# Patient Record
Sex: MALE | Race: WHITE | NOT HISPANIC OR LATINO | Employment: FULL TIME | URBAN - METROPOLITAN AREA
[De-identification: names, ages, dates, MRNs, and addresses within clinical notes are randomized per-mention and may not be internally consistent; named-entity substitution may affect disease eponyms.]

---

## 2017-01-26 ENCOUNTER — ALLSCRIPTS OFFICE VISIT (OUTPATIENT)
Dept: OTHER | Facility: OTHER | Age: 62
End: 2017-01-26

## 2017-01-26 DIAGNOSIS — Z13.6 ENCOUNTER FOR SCREENING FOR CARDIOVASCULAR DISORDERS: ICD-10-CM

## 2018-01-11 NOTE — PROGRESS NOTES
Assessment    1  Encounter for preventive health examination (V70 0) (Z00 00)    Plan  Health Maintenance    · Follow-up visit in 1 year Evaluation and Treatment  Physical  Status: Hold For -  Scheduling  Requested for: [id number]   · Begin a limited exercise program ; Status:Complete;   Done: 29ZZR3189   · Brush your teeth freq1 and floss at least once a day ; Status:Complete;   Done:  62KNK4608   · Drink plenty of fluids ; Status:Complete;   Done: 60GPI8198   · Limit your use of alcohol to 2 drinks or cans of beer a day ; Status:Complete;   Done:  48NOX0475   · Some eating tips that can help you lose weight ; Status:Complete;   Done: 55NGG1337   · We recommend that you follow these steps to lower your risk of osteoporosis  ;  Status:Complete;   Done: 09XUU3167  Screening for cardiovascular condition    · (1) COMPREHENSIVE METABOLIC PANEL; Status:Active; Requested [id number];    · (1) LIPID PANEL FASTING W DIRECT LDL REFLEX; Status:Active; Requested  [id number];    · (1) URINALYSIS (will reflex a microscopy if leukocytes, occult blood, protein or nitrites  are not within normal limits); Status:Active; Requested [id number];   Screening PSA (prostate specific antigen)    · (1) PSA (SCREEN) (Dx V76 44 Screen for Prostate Cancer); Status:Active; Requested  [id number];     Discussion/Summary  Impression: health maintenance visit, healthy adult male  Currently, he eats a healthy diet and has an adequate exercise regimen  Prostate cancer screening: the risks and benefits of prostate cancer screening were discussed and PSA was ordered  Testicular cancer screening: testicular cancer screening is not indicated  Colorectal cancer screening: the risks and benefits of colorectal cancer screening were discussed, colorectal cancer screening is current and the next colonoscopy is due 2019   The risks and benefits of immunizations were discussed, immunizations are needed and pt to obtain shingles vaccine at local pharmacy  He was advised to be evaluated by a dentist  Advice and education were given regarding nutrition, aerobic exercise, calcium supplements, vitamin D supplements, cardiovascular risk reduction, alcohol use and sunscreen use  Patient discussion: discussed with the patient, 45 minute visit, greater than half of the time was spent on counseling  Depression screen- negative  History of Present Illness  HM, Adult Male: The patient is being seen for a health maintenance evaluation  The last health maintenance visit was more than 1 year(s) ago  General Health: The patient's health since the last visit is described as good  He does not have regular dental visits (he has not established with a local dentist)  The patient brushes time(s) a day, flosses time(s) a day and reports his last dental visit was 11/2015  He complains of vision problems (FOllowed by opto in [place])  Vision care includes wearing reading glasses and an eye examination within the last year  He denies hearing loss  Immunizations status: not up to date (influenza 9/2016; tdap 2013) The patient needs the following immunization(s): zoster vaccine  Lifestyle:  He consumes a diverse and healthy diet  He is on a low carbohydrate and low salt diet  He exercises regularly  He exercises 5 times per week  Exercise includes running/jogging and biking  He does not use tobacco  He consumes alcohol  Reproductive health:  the patient is sexually active  He denies erectile dysfunction  Screening: Prostate cancer screening includes last prostate-specific antigen testing more than 1  Testicular cancer screening includes no self testicular examinations  Colorectal cancer screening includes last colonoscopy done 2014 and Last colonoscopy done 2014, next due 2019  Safety elements used: seat belt, bicycle helmet and smoke detector, but no sunscreen  HPI: 65yo male here as a new patient   His family relocated here from [place] one year ago due to his work  He is originally from [place]  Review of Systems    Constitutional: No fever or chills, feels well, no tiredness, no recent weight gain or weight loss  ENT: no complaints of earache, no hearing loss, no nosebleeds, no nasal discharge, no sore throat, no hoarseness  Cardiovascular: No complaints of slow heart rate, no fast heart rate, no chest pain, no palpitations, no leg claudication, no lower extremity  Respiratory: No complaints of shortness of breath, no wheezing, no cough, no SOB on exertion, no orthopnea or PND  Gastrointestinal: No complaints of abdominal pain, no constipation, no nausea or vomiting, no diarrhea or bloody stools  The patient presents with complaints of nocturia (once nightly)  Musculoskeletal: carmps  Neurological: No compliants of headache, no confusion, no convulsions, no numbness or tingling, no dizziness or fainting, no limb weakness, no difficulty walking  Psychiatric: Is not suicidal, no sleep disturbances, no anxiety or depression, no change in personality, no emotional problems  Past Medical History    · History of Borderline high cholesterol (272 9) (E78 9)   · History of hypertension (V12 59) (Z86 79)   · History of squamous cell carcinoma of skin (V10 83) (U49 023)    Surgical History    · History of Inguinal Hernia Repair    Family History  Mother    · Family history of Colon cancer  Father    · Family history of cerebrovascular accident (CVA) (V17 1) (Z82 3)   · Family history of hypertension (V17 49) (Z82 49)  Brother    · Family history of hypertension (V17 49) (Z82 49)    Social History    · Alcohol use (V49 89) (Z78 9)   ·    · Never a smoker   · Occupation   · Pharmacist    Current Meds   1  Aspirin 81 MG Oral Tablet Delayed Release; TAKE 1 TABLET BY MOUTH EVERY DAY   Recorded    Allergies    1   No Known Drug Allergies    Vitals   Recorded: [id number] 04:06PM Recorded: 48JNP6745 04:02PM   Heart Rate 50 Respiration 16    Systolic 514    Diastolic 90    Height  5 ft 10 in   Weight  171 lb 6 08 oz   BMI Calculated  24 59   BSA Calculated  1 96   O2 Saturation 94      Physical Exam    Constitutional   General appearance: No acute distress, well appearing and well nourished  Head and Face   Head and face: Normal     Eyes   Conjunctiva and lids: No erythema, swelling or discharge  Ears, Nose, Mouth, and Throat   External inspection of ears and nose: Normal     Otoscopic examination: Tympanic membranes translucent with normal light reflex  Canals patent without erythema  Hearing: Normal     Nasal mucosa, septum, and turbinates: Normal without edema or erythema  Lips, teeth, and gums: Normal, good dentition  Oropharynx: Normal with no erythema, edema, exudate or lesions  Neck   Neck: Supple, symmetric, trachea midline, no masses  Thyroid: Normal, no thyromegaly  Pulmonary   Respiratory effort: No increased work of breathing or signs of respiratory distress  Auscultation of lungs: Clear to auscultation  Cardiovascular   Auscultation of heart: Normal rate and rhythm, normal S1 and S2, no murmurs  Carotid pulses: 2+ bilaterally  Pedal pulses: 2+ bilaterally  Examination of extremities for edema and/or varicosities: Normal     Abdomen   Abdomen: Non-tender, no masses  The abdomen was flat  Bowel sounds were normal  The abdomen was soft and nontender  The abdomen was normal to percussion  Stool sample for occult blood: Negative  Genitourinary   Digital rectal exam of prostate: Normal size, no masses  Lymphatic   Palpation of lymph nodes in neck: No lymphadenopathy  Musculoskeletal   Gait and station: Normal     Neurologic No focal deficit     Psychiatric   Orientation to person, place and time: Normal     Mood and affect: Normal        Results/Data  PHQ-2 Adult Depression Screening 26Jan2017 04:27PM [place]     Test Name Result Flag Reference   PHQ-2 Adult Depression Score 0     Over the last two weeks, how often have you been bothered by any of the following problems?   Little interest or pleasure in doing things: Not at all - 0  Feeling down, depressed, or hopeless: Not at all - 0   PHQ-2 Adult Depression Screening Negative         Signatures   Electronically signed by : Jessica Calle DO; Jan 26 2017  4:45PM EST                       (Author)

## 2018-01-14 VITALS
RESPIRATION RATE: 16 BRPM | DIASTOLIC BLOOD PRESSURE: 90 MMHG | SYSTOLIC BLOOD PRESSURE: 130 MMHG | WEIGHT: 171.38 LBS | HEART RATE: 50 BPM | OXYGEN SATURATION: 94 % | BODY MASS INDEX: 24.54 KG/M2 | HEIGHT: 70 IN

## 2018-02-01 ENCOUNTER — OFFICE VISIT (OUTPATIENT)
Dept: INTERNAL MEDICINE CLINIC | Facility: CLINIC | Age: 63
End: 2018-02-01
Payer: COMMERCIAL

## 2018-02-01 VITALS
TEMPERATURE: 97.5 F | BODY MASS INDEX: 25.11 KG/M2 | RESPIRATION RATE: 16 BRPM | SYSTOLIC BLOOD PRESSURE: 110 MMHG | HEART RATE: 52 BPM | HEIGHT: 70 IN | OXYGEN SATURATION: 99 % | WEIGHT: 175.4 LBS | DIASTOLIC BLOOD PRESSURE: 80 MMHG

## 2018-02-01 DIAGNOSIS — Z00.00 ROUTINE HEALTH MAINTENANCE: Primary | ICD-10-CM

## 2018-02-01 DIAGNOSIS — Z11.59 NEED FOR HEPATITIS C SCREENING TEST: ICD-10-CM

## 2018-02-01 DIAGNOSIS — Z12.5 SCREENING FOR PROSTATE CANCER: ICD-10-CM

## 2018-02-01 DIAGNOSIS — Z13.6 SCREENING FOR CARDIOVASCULAR CONDITION: ICD-10-CM

## 2018-02-01 PROCEDURE — 99396 PREV VISIT EST AGE 40-64: CPT | Performed by: INTERNAL MEDICINE

## 2018-02-01 NOTE — PATIENT INSTRUCTIONS

## 2018-02-01 NOTE — PROGRESS NOTES
Assessment/Plan:    No problem-specific Assessment & Plan notes found for this encounter  1  Routine health maintenance     2  Screening for cardiovascular condition  Comprehensive metabolic panel    Lipid panel    UA (URINE) with reflex to Microscopic   3  Need for hepatitis C screening test  Hepatitis C antibody   4  Screening for prostate cancer  PSA         Subjective:      Patient ID: Jorge A Mazariegos is a 63 y o  male  Jorge A Mazariegos is here for yearly physical   Over the past two days he has felt "off"  Denies fever  Last dental visit 1/2018  Last eye visit 12/2016      Immunization History  Administered            Date(s) Administered    Influenza TIV (IM)    09/01/2016      MMR                   10/31/2012      Tdap                  01/01/2013      Tuberculin Skin Test-PPD Intradermal                          10/29/2012      Lifestyle:    Diet is regular, sometimes low carb, low sodium              Physical activity exercises five times per week, running and cycling and minimal free weights    reports that he drinks about 6 0 oz of alcohol per week   reports that he has never smoked  He has never used smokeless tobacco     reports that he does not use drugs  Reproductive:     has no sexual activity history on file  He is sexually active with his wife  Erectile dysfunction No    Cancer Screenings:   Testicular n/a   Last PSA Yes 2/4/17   Colonoscopy 2014, next due in 2019             The following portions of the patient's history were reviewed and updated as appropriate: allergies, current medications, past family history, past medical history, past social history, past surgical history and problem list     Review of Systems   Constitutional: Negative  HENT: Negative  Respiratory: Negative  Cardiovascular: Negative  Gastrointestinal: Negative  Genitourinary:        Occasional nocturia but admits to drinking tea before bed   Musculoskeletal: Negative  Neurological: Negative  Psychiatric/Behavioral: Negative  Objective:  /80 (BP Location: Left arm, Patient Position: Sitting, Cuff Size: Adult)   Pulse (!) 52   Temp 97 5 °F (36 4 °C)   Resp 16   Ht 5' 10" (1 778 m)   Wt 79 6 kg (175 lb 6 4 oz)   SpO2 99%   BMI 25 17 kg/m²        Physical Exam   Constitutional: He is oriented to person, place, and time  He appears well-developed and well-nourished  No distress  HENT:   Right Ear: External ear normal    Left Ear: External ear normal    Nose: Nose normal    Mouth/Throat: Oropharynx is clear and moist  No oropharyngeal exudate  Eyes: EOM are normal  Pupils are equal, round, and reactive to light  Neck: Neck supple  Cardiovascular: Normal rate, regular rhythm, normal heart sounds and intact distal pulses  Pulmonary/Chest: Effort normal    Abdominal: Soft  Bowel sounds are normal  There is no tenderness  Genitourinary: Rectum normal and prostate normal  Rectal exam shows guaiac negative stool  Lymphadenopathy:     He has no cervical adenopathy  Neurological: He is alert and oriented to person, place, and time  Psychiatric: He has a normal mood and affect  Vitals reviewed

## 2018-03-08 ENCOUNTER — OFFICE VISIT (OUTPATIENT)
Dept: INTERNAL MEDICINE CLINIC | Facility: CLINIC | Age: 63
End: 2018-03-08
Payer: COMMERCIAL

## 2018-03-08 VITALS
SYSTOLIC BLOOD PRESSURE: 138 MMHG | OXYGEN SATURATION: 98 % | HEART RATE: 55 BPM | DIASTOLIC BLOOD PRESSURE: 84 MMHG | HEIGHT: 70 IN | TEMPERATURE: 96.1 F | WEIGHT: 176 LBS | BODY MASS INDEX: 25.2 KG/M2

## 2018-03-08 DIAGNOSIS — R42 DIZZINESS: Primary | ICD-10-CM

## 2018-03-08 DIAGNOSIS — R00.1 SINUS BRADYCARDIA: ICD-10-CM

## 2018-03-08 DIAGNOSIS — H93.11 TINNITUS OF RIGHT EAR: ICD-10-CM

## 2018-03-08 PROCEDURE — 99214 OFFICE O/P EST MOD 30 MIN: CPT | Performed by: INTERNAL MEDICINE

## 2018-03-08 PROCEDURE — 93000 ELECTROCARDIOGRAM COMPLETE: CPT | Performed by: INTERNAL MEDICINE

## 2018-03-08 NOTE — ASSESSMENT & PLAN NOTE
Clinical history consistent with vertigo  With tinnitus and FH of mernier's there is possibility of Meniere's as well    Obtain labs and audiogram

## 2018-03-08 NOTE — PROGRESS NOTES
Assessment/Plan:    Dizziness  Clinical history consistent with vertigo  With tinnitus and FH of mernier's there is possibility of Meniere's as well  Obtain labs and audiogram       1  Dizziness  POCT ECG    TSH, 3rd generation with T4 reflex    CBC    Basic metabolic panel    Comprehensive hearing evaluation   2  Tinnitus of right ear  Comprehensive hearing evaluation   3  Sinus bradycardia      noted on ekg  Patient is physically active  will monitor  Subjective:      Patient ID: Albert Veras is a 63 y o  male  HPI     He has had two episodes of vertigo  First episode several weeks ago, felt disoriented, no nausea and fell asleep  Last Wed had another episode, felt unstable walking  If he moves his head sideways then would have rocking sensation, lasting for a few seconds, he went home and slept it off  He has tinnitus and has FH of meniere's disease  He denies hearing loss  He has had a HA in the past few weeks but he does not usually have Ha  He denies URI or allergy symptoms, CP or palpitations  The following portions of the patient's history were reviewed and updated as appropriate: allergies, current medications, past family history, past medical history, past social history, past surgical history and problem list     Current Outpatient Prescriptions:     aspirin 81 MG tablet, Take 1 tablet by mouth daily, Disp: , Rfl:       Review of Systems   Constitutional: Negative for fever  HENT: Positive for tinnitus  Negative for hearing loss  Eyes: Negative  Respiratory: Negative  Cardiovascular: Negative  Gastrointestinal: Negative for nausea  Neurological: Positive for dizziness and headaches  Negative for syncope        Objective:      /84 (BP Location: Left arm, Patient Position: Sitting, Cuff Size: Adult)   Pulse 55   Temp (!) 96 1 °F (35 6 °C) (Tympanic)   Ht 5' 10" (1 778 m)   Wt 79 8 kg (176 lb)   SpO2 98%   BMI 25 25 kg/m²        Physical Exam Constitutional: He is oriented to person, place, and time  He appears well-developed and well-nourished  No distress  HENT:   Right Ear: External ear normal    Left Ear: External ear normal    Nose: Nose normal    Mouth/Throat: Oropharynx is clear and moist  No oropharyngeal exudate  Eyes: Conjunctivae and EOM are normal  Pupils are equal, round, and reactive to light  No nystagmus   Cardiovascular: Regular rhythm and normal heart sounds  Bradycardia present  Pulmonary/Chest: Breath sounds normal  No respiratory distress  Neurological: He is alert and oriented to person, place, and time  Piqua hallpike maneuver neg   Vitals reviewed  EKG: sinus jessica at 53bpm, normal axis, rsr' in V1  No old ekg to compare

## 2018-03-22 ENCOUNTER — OFFICE VISIT (OUTPATIENT)
Dept: INTERNAL MEDICINE CLINIC | Facility: CLINIC | Age: 63
End: 2018-03-22
Payer: COMMERCIAL

## 2018-03-22 VITALS
SYSTOLIC BLOOD PRESSURE: 130 MMHG | RESPIRATION RATE: 14 BRPM | HEIGHT: 70 IN | DIASTOLIC BLOOD PRESSURE: 80 MMHG | TEMPERATURE: 97.3 F | OXYGEN SATURATION: 97 % | BODY MASS INDEX: 24.85 KG/M2 | WEIGHT: 173.6 LBS | HEART RATE: 65 BPM

## 2018-03-22 DIAGNOSIS — E78.2 MIXED HYPERLIPIDEMIA: ICD-10-CM

## 2018-03-22 DIAGNOSIS — R42 VERTIGO: Primary | ICD-10-CM

## 2018-03-22 PROCEDURE — 3008F BODY MASS INDEX DOCD: CPT | Performed by: INTERNAL MEDICINE

## 2018-03-22 PROCEDURE — 99214 OFFICE O/P EST MOD 30 MIN: CPT | Performed by: INTERNAL MEDICINE

## 2018-03-22 NOTE — ASSESSMENT & PLAN NOTE
Increasing episodes associated with tinnitus and vision change  Has HLD and FH of stroke  Developed new [place]  Obtain MRI brain r/o posterior stroke, if negative consider vestibular rehab

## 2018-03-22 NOTE — PROGRESS NOTES
Assessment/Plan:    Vertigo  Increasing episodes associated with tinnitus and vision change  Has HLD and FH of stroke  Developed new [facility]  Obtain MRI brain r/o posterior stroke, if negative consider vestibular rehab  1  Vertigo  MRI brain w wo contrast   2  Mixed hyperlipidemia  MRI brain w wo contrast       Subjective:      Patient ID: Yumiko Ramirez is a 63 y o  male  HPI     He was seen and evaluated for vertigo symptoms two weeks ago, with two episodes occurring several weeks prior to presentation  At the time, he felt disoriented associated with tunnel vision, had rocking sensation with his head sideways and slept off his symptoms  No nausea noted  He reported having FH of meniere's which today he reports is incorrect  His father had strokes  Since, he has had three other episodes  He shook his head with a bit of force, another episode when he was reading on his computer and felt unstable  He took meclizine and fell asleep  He has had a HA in the past month, described as a "blinding Ha", improved with excedrin, he thought he was dehydrated, but does not normally get HAs   No chest pain, SOB, fever  Has tinnitus for which he is going for audiogram next week  The following portions of the patient's history were reviewed and updated as appropriate: allergies, current medications, past family history, past medical history, past social history, past surgical history and problem list     Current Outpatient Prescriptions:     aspirin 81 MG tablet, Take 1 tablet by mouth daily, Disp: , Rfl:       Review of Systems   Constitutional: Negative for fever  HENT: Positive for sneezing and tinnitus  Negative for hearing loss  Eyes: Positive for visual disturbance  Respiratory: Negative  Cardiovascular: Negative  Gastrointestinal: Negative for nausea  Neurological: Positive for dizziness and headaches  Negative for syncope         Objective:    /80   Pulse 65   Temp (!) 97 3 °F (36 3 °C)   Resp 14   Ht 5' 10" (1 778 m)   Wt 78 7 kg (173 lb 9 6 oz)   SpO2 97%   BMI 24 91 kg/m²      Physical Exam   Constitutional: He is oriented to person, place, and time  He appears well-developed and well-nourished  No distress  HENT:   Right Ear: External ear normal    Left Ear: External ear normal    Nose: Nose normal    Mouth/Throat: Oropharynx is clear and moist  No oropharyngeal exudate  Eyes: Conjunctivae and EOM are normal  Pupils are equal, round, and reactive to light  No nystagmus   Cardiovascular: Normal rate, regular rhythm and normal heart sounds  Pulmonary/Chest: Breath sounds normal  No respiratory distress  Neurological: He is alert and oriented to person, place, and time  No cranial nerve deficit  Constantine hallpike maneuver neg   Psychiatric: He has a normal mood and affect  Vitals reviewed

## 2018-03-24 ENCOUNTER — HOSPITAL ENCOUNTER (OUTPATIENT)
Dept: MRI IMAGING | Facility: HOSPITAL | Age: 63
Discharge: HOME | End: 2018-03-24
Payer: COMMERCIAL

## 2018-03-24 DIAGNOSIS — R42 VERTIGO: ICD-10-CM

## 2018-03-24 DIAGNOSIS — E78.2 MIXED HYPERLIPIDEMIA: ICD-10-CM

## 2018-03-24 PROCEDURE — A9585 GADOBUTROL INJECTION: HCPCS | Performed by: INTERNAL MEDICINE

## 2018-03-24 PROCEDURE — 70553 MRI BRAIN STEM W/O & W/DYE: CPT

## 2018-03-24 RX ADMIN — GADOBUTROL 8 ML: 604.72 INJECTION INTRAVENOUS at 16:24

## 2018-03-26 ENCOUNTER — OFFICE VISIT (OUTPATIENT)
Dept: AUDIOLOGY | Age: 63
End: 2018-03-26
Payer: COMMERCIAL

## 2018-03-26 DIAGNOSIS — H90.3 SENSORINEURAL HEARING LOSS, BILATERAL: Primary | ICD-10-CM

## 2018-03-26 PROCEDURE — 92557 COMPREHENSIVE HEARING TEST: CPT | Performed by: AUDIOLOGIST

## 2018-03-26 PROCEDURE — 92567 TYMPANOMETRY: CPT | Performed by: AUDIOLOGIST

## 2018-03-26 NOTE — LETTER
March 26, 2018     Humphrey En,   [address]  [address], [address] [facility],[address]    Patient: Minh Hines   YOB: 1955   Date of Visit: 3/26/2018       Dear Dr Luis Alberto Rodriguez: Thank you for referring Minh Hines to me for evaluation  Below are my notes for this consultation  If you have questions, please do not hesitate to call me  I look forward to following your patient along with you           Sincerely,        Uzma Ha        CC: No Recipients

## 2018-06-11 ENCOUNTER — TRANSCRIBE ORDERS (OUTPATIENT)
Dept: PHYSICAL THERAPY | Facility: CLINIC | Age: 63
End: 2018-06-11

## 2018-06-11 ENCOUNTER — EVALUATION (OUTPATIENT)
Dept: PHYSICAL THERAPY | Facility: CLINIC | Age: 63
End: 2018-06-11
Payer: COMMERCIAL

## 2018-06-11 DIAGNOSIS — H81.8X1 UNILATERAL VESTIBULAR WEAKNESS, RIGHT: Primary | ICD-10-CM

## 2018-06-11 DIAGNOSIS — R42 VERTIGO: ICD-10-CM

## 2018-06-11 PROCEDURE — 97110 THERAPEUTIC EXERCISES: CPT

## 2018-06-11 PROCEDURE — 97162 PT EVAL MOD COMPLEX 30 MIN: CPT

## 2018-06-11 PROCEDURE — G8979 MOBILITY GOAL STATUS: HCPCS

## 2018-06-11 PROCEDURE — G8978 MOBILITY CURRENT STATUS: HCPCS

## 2018-06-11 NOTE — PROGRESS NOTES
PT Evaluation     Today's date: 2018  Patient name: Karina Damon  :   MRN: [id number]  Referring provider: Robin Mondragon MD  Dx:   Encounter Diagnosis     ICD-10-CM    1  Unilateral vestibular weakness, right H81 8X1    2  Vertigo R42        Start Time:   Stop Time: 8521  Total time in clinic (min): 55 minutes    Assessment  Impairments: activity intolerance and impaired balance  Patient presents with symptom irritability yes  Assessment details: Pt is a 66year old male referred from ENT with diagnosis of unilateral vestibular weakness evaluated by physical therapy on 18  Pt presents with impaired balance in tandem stance with EC, and has mild to moderate symptom reproduction with VOR 1 for horizontal and vertical directions  Occulomotor testing, positional testing and head thrust were negative for nystagmus or symptom reproduction  Motion Sensitivity Quotient (MSQ) score was 0 19%, however indicated mild symptom reproduction for head turns and head tilt  Pt presented with Dizziness Handicap Inventory (DHI) score of 48/100 indicating moderate impact on pt quality of life  Pt is not a fall risk based upon DGI score but is a good candidate for physical therapy to address the above listed deficits and progress toward goals of reducing symptom reproduction and returning to previous level of activity  Pt's availability may be limited by work scheduling, therefore increased emphasis on HEP should be utilized  Understanding of Dx/Px/POC: excellent  Goals  ST-4 weeks  1 ) Early independence with HEP for VOR 1 with VS and static tandem balance  2 ) Decrease symptom reproduction by 50% with head tilt/turning    3 ) Increased tandem balance with EC > 20 seconds  4 ) Decreased DHI score < 20/100  LT-8 weeks   1 ) Decreased DHI score < 10/100   2 ) Return to full speed ADL's   3 ) Return to full speed recreational activity of biking with no symptom reproduction     4 ) Increased tandem standing balance with EC > 30 sec  Plan  Patient would benefit from: skilled physical therapy  Planned therapy interventions: balance, manual therapy, neuromuscular re-education, therapeutic activities, therapeutic exercise, canalith repositioning, patient education and home exercise program  Frequency: 2x week  Duration in weeks: 8  Plan details: POC 18 to 18  Subjective Evaluation    History of Present Illness  Mechanism of injury: 66year old male reports earlier in the year, he noted rocking/woosiness/imbalance while at work and had difficulty driving home  This repeated a few weeks later  MRI revealed mucosal thickening in sinuses  Hearing test reveals hearing loss in the right ear  VNG was done one month ago, with finding of unilateral vestibular weakness right  Current symptoms: tinnitus intermittently, and every two weeks dizzy sx present, at a level of 3/5  He sits and rest at that point  It's a few hours for sx to resolve  Balance: improving but not back to normal   Work: adminstrative  He has returned to work  Not avoiding any activity  He has returned to mountain biking  Pain  Current pain ratin  Location: denies pain  Social Support  Steps to enter house: yes  Stairs in house: yes   Lives in: multiple-level home  Lives with: spouse    Employment status: working  Treatments  No previous or current treatments  Patient Goals  Patient goals for therapy: improved balance and return to sport/leisure activities  Patient goal: eliminate dizziness  Objective     Ambulation     Ambulation: Level Surfaces     Additional Level Surfaces Ambulation Details  Coordination:   Finger to nose: normal  Heel to shin:  normal  WILMER:  UE:normal     LE: normal  Strength:  Hip flexion:  R=5L=5  Quads:  R = 5   L = 5  HS=5/5  Ankles: R=5  L=5    Oculomotor:  SP: normal,  Saccades:normal  Head thrust: normal  REO:normal, 30 sec   REC:normal  SREO:normal  FT EO foam= 30 sec, normal  FT EC foam = 30 seconds, mild sway   SREC:(+)  SLS:L=  30 sec   r=30 sec     Gait speed:1 79m/sec or  5 8 ft/sec   DGI:24/24  TUG:NT  5XSTS:NT  DHI=48/100    MSQ=   Positionals  Right Roll test negative  Left Roll test negative  Right DixHallpike: negative   Left DixHallpike: negative  Gaze stabilization:   x1 60 seconds H= mild dizziness provoked  x1 60 seconds V=mild dizziness provoked  Pt has been performing this exercise at home without sx    x2 horizontal 60 sec= no sx  Flowsheet Rows      Most Recent Value   PT/OT G-Codes   Current Score  79   Projected Score  94   FOTO information reviewed  Yes   Assessment Type  Evaluation   G code set  Mobility: Walking & Moving Around   Mobility: Walking and Moving Around Current Status ()  CJ   Mobility: Walking and Moving Around Goal Status ()  CI          Precautions standard    Specialty Daily Treatment Diary     Manual                                                     Exercise Diary  6/11/18       VOR 1 with VS Contrast added to HEP         Tandem stance EO/EC 20 sec, 5 reps        Tandem stance EO/EO with HT 20 reps each, H & V         VOR 2 with VS        Foam with HT EO/EC        Gait with HT , diagnonals         Gait with VOR 1                                                                                                                     Modalities

## 2018-06-11 NOTE — LETTER
2018    Goldy Amador, [address] [employer] [facility] [address]    Patient: Derick Pickens   YOB: 1955   Date of Visit: 2018     Encounter Diagnosis     ICD-10-CM    1  Unilateral vestibular weakness, right H81 8X1    2  Vertigo R42        Dear Dr Darliss Fleischer:    Please review the attached Plan of Care from [facility] recent visit  Please verify that you agree therapy should continue by signing the attached document and sending it back to our office  If you have any questions or concerns, please don't hesitate to call  Sincerely,    Shauna Meza, PT      Referring Provider:      I certify that I have read the below Plan of Care and certify the need for these services furnished under this plan of treatment while under my care  Goldy Amador MD  [address] [facility]  [address]  VIA Facsimile: [phone]          PT Evaluation     Today's date: 2018  Patient name: Derick Pickens  :   MRN: [id number]  Referring provider: Geri Guillermo MD  Dx:   Encounter Diagnosis     ICD-10-CM    1  Unilateral vestibular weakness, right H81 8X1    2  Vertigo R42        Start Time:   Stop Time:   Total time in clinic (min): 55 minutes    Assessment  Impairments: activity intolerance and impaired balance  Patient presents with symptom irritability yes  Assessment details: Pt is a 66year old male referred from ENT with diagnosis of unilateral vestibular weakness evaluated by physical therapy on 18  Pt presents with impaired balance in tandem stance with EC, and has mild to moderate symptom reproduction with VOR 1 for horizontal and vertical directions  Occulomotor testing, positional testing and head thrust were negative for nystagmus or symptom reproduction  Motion Sensitivity Quotient (MSQ) score was 0 19%, however indicated mild symptom reproduction for head turns and head tilt   Pt presented with Dizziness Handicap Inventory (DHI) score of 48/100 indicating moderate impact on pt quality of life  Pt is not a fall risk based upon DGI score but is a good candidate for physical therapy to address the above listed deficits and progress toward goals of reducing symptom reproduction and returning to previous level of activity  Pt's availability may be limited by work scheduling, therefore increased emphasis on HEP should be utilized  Understanding of Dx/Px/POC: excellent  Goals  ST-4 weeks  1 ) Early independence with HEP for VOR 1 with VS and static tandem balance  2 ) Decrease symptom reproduction by 50% with head tilt/turning    3 ) Increased tandem balance with EC > 20 seconds  4 ) Decreased DHI score < 20/100  LT-8 weeks   1 ) Decreased DHI score < 10/100   2 ) Return to full speed ADL's   3 ) Return to full speed recreational activity of biking with no symptom reproduction  4 ) Increased tandem standing balance with EC > 30 sec  Plan  Patient would benefit from: skilled physical therapy  Planned therapy interventions: balance, manual therapy, neuromuscular re-education, therapeutic activities, therapeutic exercise, canalith repositioning, patient education and home exercise program  Frequency: 2x week  Duration in weeks: 8  Plan details: POC 18 to 18  Subjective Evaluation    History of Present Illness  Mechanism of injury: 66year old male reports earlier in the year, he noted rocking/woosiness/imbalance while at work and had difficulty driving home  This repeated a few weeks later  MRI revealed mucosal thickening in sinuses  Hearing test reveals hearing loss in the right ear  VNG was done one month ago, with finding of unilateral vestibular weakness right  Current symptoms: tinnitus intermittently, and every two weeks dizzy sx present, at a level of 3/5  He sits and rest at that point  It's a few hours for sx to resolve   Balance: improving but not back to normal   Work: adminstrative  He has returned to work  Not avoiding any activity  He has returned to mountain biking  Pain  Current pain ratin  Location: denies pain  Social Support  Steps to enter house: yes  Stairs in house: yes   Lives in: multiple-level home  Lives with: spouse    Employment status: working  Treatments  No previous or current treatments  Patient Goals  Patient goals for therapy: improved balance and return to sport/leisure activities  Patient goal: eliminate dizziness  Objective     Ambulation     Ambulation: Level Surfaces     Additional Level Surfaces Ambulation Details  Coordination:   Finger to nose: normal  Heel to shin:  normal  WILMER:  UE:normal     LE: normal  Strength:  Hip flexion:  R=5L=5  Quads:  R = 5   L = 5  HS=5/5  Ankles: R=5  L=5  Oculomotor:  SP: normal,  Saccades:normal  Head thrust: normal  REO:normal, 30 sec   REC:normal  SREO:normal  FT EO foam= 30 sec, normal  FT EC foam = 30 seconds, mild sway   SREC:(+)  SLS:L=  30 sec   r=30 sec     Gait speed:1 79m/sec or  5 8 ft/sec   DGI:  TUG:NT  5XSTS:NT  DHI=48/100    MSQ=   Positionals  Right Roll test negative  Left Roll test negative  Right DixHallpike: negative   Left DixHallpike: negative  Gaze stabilization:   x1 60 seconds H= mild dizziness provoked  x1 60 seconds V=mild dizziness provoked  Pt has been performing this exercise at home without sx    x2 horizontal 60 sec= no sx           Flowsheet Rows      Most Recent Value   PT/OT G-Codes   Current Score  79   Projected Score  94   FOTO information reviewed  Yes   Assessment Type  Evaluation   G code set  Mobility: Walking & Moving Around   Mobility: Walking and Moving Around Current Status ()  CJ   Mobility: Walking and Moving Around Goal Status ()  CI          Precautions standard    Specialty Daily Treatment Diary     Manual                                                     Exercise Diary  18       VOR 1 with VS Contrast added to HEP         Tandem stance EO/EC 20 sec, 5 reps        Tandem stance EO/EO with HT 20 reps each, H & V         VOR 2 with VS        Foam with HT EO/EC        Gait with HT , diagnonals         Gait with VOR 1                                                                                                                     Modalities

## 2018-06-18 ENCOUNTER — OFFICE VISIT (OUTPATIENT)
Dept: PHYSICAL THERAPY | Facility: CLINIC | Age: 63
End: 2018-06-18
Payer: COMMERCIAL

## 2018-06-18 DIAGNOSIS — R42 VERTIGO: ICD-10-CM

## 2018-06-18 DIAGNOSIS — H81.8X1 UNILATERAL VESTIBULAR WEAKNESS, RIGHT: Primary | ICD-10-CM

## 2018-06-18 PROCEDURE — 97110 THERAPEUTIC EXERCISES: CPT

## 2018-06-18 PROCEDURE — G8980 MOBILITY D/C STATUS: HCPCS

## 2018-06-18 PROCEDURE — G8979 MOBILITY GOAL STATUS: HCPCS

## 2018-06-18 PROCEDURE — 97112 NEUROMUSCULAR REEDUCATION: CPT

## 2018-06-18 PROCEDURE — 97530 THERAPEUTIC ACTIVITIES: CPT

## 2018-06-25 ENCOUNTER — APPOINTMENT (OUTPATIENT)
Dept: PHYSICAL THERAPY | Facility: CLINIC | Age: 63
End: 2018-06-25
Payer: COMMERCIAL

## 2019-03-05 ENCOUNTER — OFFICE VISIT (OUTPATIENT)
Dept: INTERNAL MEDICINE CLINIC | Facility: CLINIC | Age: 64
End: 2019-03-05
Payer: COMMERCIAL

## 2019-03-05 VITALS
SYSTOLIC BLOOD PRESSURE: 130 MMHG | BODY MASS INDEX: 24.31 KG/M2 | TEMPERATURE: 98.6 F | OXYGEN SATURATION: 99 % | HEART RATE: 56 BPM | DIASTOLIC BLOOD PRESSURE: 72 MMHG | RESPIRATION RATE: 16 BRPM | WEIGHT: 169.8 LBS | HEIGHT: 70 IN

## 2019-03-05 DIAGNOSIS — Z12.5 SCREENING FOR PROSTATE CANCER: ICD-10-CM

## 2019-03-05 DIAGNOSIS — Z00.00 ANNUAL PHYSICAL EXAM: Primary | ICD-10-CM

## 2019-03-05 DIAGNOSIS — Z12.11 SCREENING FOR COLON CANCER: ICD-10-CM

## 2019-03-05 DIAGNOSIS — E78.2 MIXED HYPERLIPIDEMIA: ICD-10-CM

## 2019-03-05 PROBLEM — M65.9 TENOSYNOVITIS OF HAND: Status: ACTIVE | Noted: 2019-03-05

## 2019-03-05 PROBLEM — M65.949 TENOSYNOVITIS OF HAND: Status: ACTIVE | Noted: 2019-03-05

## 2019-03-05 PROBLEM — H93.11 TINNITUS OF RIGHT EAR: Status: RESOLVED | Noted: 2018-03-08 | Resolved: 2019-03-05

## 2019-03-05 PROCEDURE — 99396 PREV VISIT EST AGE 40-64: CPT | Performed by: INTERNAL MEDICINE

## 2019-03-05 NOTE — PROGRESS NOTES
ADULT ANNUAL PHYSICAL  [facility] INTERNAL MEDICINE    NAME: Alis Herzog  AGE: 66 y o  SEX: male  : 1955     DATE: 3/5/2019     Assessment and Plan:     Problem List Items Addressed This Visit        Other    Mixed hyperlipidemia    Relevant Orders    Lipid panel    Comprehensive metabolic panel    TSH, 3rd generation with Free T4 reflex      Other Visit Diagnoses     Annual physical exam    -  Primary    Screening for colon cancer        Relevant Orders    Ambulatory referral to Gastroenterology    Screening for prostate cancer        Relevant Orders    PSA, Total Screen          Health maintenance and preventative care screenings were discussed with patient today  Appropriate education was printed on patient's after visit summary  · Discussed risks/benefits of screening for colorectal cancer, prostate cancer and high cholesterol  Patient agrees to screening for colorectal cancer, prostate cancer and high cholesterol  · Immunizations were reviewed: patient is up-to-date with his immunizations  Shingrix when available    Counseling:  · Dental Health: discussed importance of regular tooth brushing, flossing, and dental visits  Return in about 1 year (around 3/5/2020) for Annual physical      Chief Complaint:     Chief Complaint   Patient presents with    Physical Exam      History of Present Illness:     Adult Annual Physical   Patient here for a comprehensive physical exam  The patient reports no problems  Reports lump in his L hand over the past 1 month  He is R handed  Diet and Physical Activity  · Diet/Nutrition: well balanced diet  · Weight concerns: none, patient's BMI is between 18 5-24 9  · Exercise: moderate cardiovascular exercise        Depression Screening  PHQ-9 Depression Screening    PHQ-9:    Frequency of the following problems over the past two weeks:       Little interest or pleasure in doing things:  0 - not at all  Feeling down, depressed, or hopeless:  0 - not at all  PHQ-2 Score:  0       General Health  · Sleep: gets 7-8 hours of sleep on average  · Hearing: significantly decreased - right  · Vision: most recent eye exam <1 year ago and wears reading glasses  · Dental: regular dental visits   Health  · Erectile dysfunction: no      Review of Systems:     Review of Systems   Constitutional:        +gradual weight loss   HENT: Negative  Respiratory: Negative  Cardiovascular: Negative  Gastrointestinal: Negative  Genitourinary: Negative  Musculoskeletal:        Hand problem   Neurological: Negative  Psychiatric/Behavioral: Negative  Past Medical History:     Past Medical History:   Diagnosis Date    Squamous cell skin cancer     left forearm      Past Surgical History:     Past Surgical History:   Procedure Laterality Date    HERNIA REPAIR Bilateral     inguinal      Social History:     Social History     Socioeconomic History    Marital status: /Civil Union     Spouse name: None    Number of children: None    Years of education: None    Highest education level: None   Occupational History    Occupation: pharmacist   Social Needs    Financial resource strain: None    Food insecurity:     Worry: None     Inability: None    Transportation needs:     Medical: None     Non-medical: None   Tobacco Use    Smoking status: Never Smoker    Smokeless tobacco: Never Used   Substance and Sexual Activity    Alcohol use:  Yes     Alcohol/week: 6 0 oz     Types: 10 Cans of beer per week    Drug use: No    Sexual activity: None   Lifestyle    Physical activity:     Days per week: None     Minutes per session: None    Stress: None   Relationships    Social connections:     Talks on phone: None     Gets together: None     Attends Restorationism service: None     Active member of club or organization: None     Attends meetings of clubs or organizations: None     Relationship status: None    Intimate partner violence:     Fear of current or ex partner: None     Emotionally abused: None     Physically abused: None     Forced sexual activity: None   Other Topics Concern    None   Social History Narrative          Family History:     Family History   Problem Relation Age of Onset    Cancer Mother         colon, diagnosed in her 42's    Stroke Father         CVA    Hypertension Father     Hypertension Brother       Current Medications:     No current outpatient medications on file  No current facility-administered medications for this visit  Allergies:     No Known Allergies   Objective:     /72 (BP Location: Left arm, Patient Position: Sitting)   Pulse 56   Temp 98 6 °F (37 °C)   Resp 16   Ht 5' 10" (1 778 m)   Wt 77 kg (169 lb 12 8 oz)   SpO2 99%   BMI 24 36 kg/m²     Physical Exam   Constitutional: He is oriented to person, place, and time  He appears well-developed and well-nourished  HENT:   Right Ear: External ear normal    Left Ear: External ear normal    Nose: Nose normal    Mouth/Throat: Oropharynx is clear and moist    Eyes: Conjunctivae and EOM are normal    Neck: Neck supple  Cardiovascular: Normal rate, regular rhythm, normal heart sounds and intact distal pulses  Pulmonary/Chest: Effort normal and breath sounds normal    Abdominal: Soft  Bowel sounds are normal    Musculoskeletal:    strength 5/5 b/l  L third digit tendon hypertrophy with nodularity on palmar aspect   Neurological: He is alert and oriented to person, place, and time  Skin: Skin is dry  Psychiatric: He has a normal mood and affect  His behavior is normal    Vitals reviewed         Health Maintenance:     Health Maintenance   Topic Date Due    Hepatitis C Screening  1955    CRC Screening: Colonoscopy  1955    Pneumococcal PPSV23 Highest Risk Adult (1 of 3 - PCV13) 05/18/1976    BMI: Adult  03/22/2019    Depression Screening PHQ  06/11/2019    DTaP,Tdap,and Td Vaccines (2 - Td) 01/01/2023    INFLUENZA VACCINE  Completed    HEPATITIS B VACCINES  Aged [employer] History   Administered Date(s) Administered    INFLUENZA 09/12/2018    Influenza TIV (IM) 09/01/2016, 09/01/2017    MMR 10/31/2012    Tdap 01/01/2013    Tuberculin Skin Test-PPD Intradermal 10/29/2012       Pattie Jefferson DO  [facility] INTERNAL MEDICINE

## 2019-03-05 NOTE — PATIENT INSTRUCTIONS
Wellness Visit for Adults   AMBULATORY CARE:   A wellness visit  is when you see your healthcare provider to get screened for health problems  You can also get advice on how to stay healthy  Write down your questions so you remember to ask them  Ask your healthcare provider how often you should have a wellness visit  What happens at a wellness visit:  Your healthcare provider will ask about your health, and your family history of health problems  This includes high blood pressure, heart disease, and cancer  He or she will ask if you have symptoms that concern you, if you smoke, and about your mood  You may also be asked about your intake of medicines, supplements, food, and alcohol  Any of the following may be done:  · Your weight  will be checked  Your height may also be checked so your body mass index (BMI) can be calculated  Your BMI shows if you are at a healthy weight  · Your blood pressure  and heart rate will be checked  Your temperature may also be checked  · Blood and urine tests  may be done  Blood tests may be done to check your cholesterol levels  Abnormal cholesterol levels increase your risk for heart disease and stroke  You may also need a blood or urine test to check for diabetes if you are at increased risk  Urine tests may be done to look for signs of an infection or kidney disease  · A physical exam  includes checking your heartbeat and lungs with a stethoscope  Your healthcare provider may also check your skin to look for sun damage  · Screening tests  may be recommended  A screening test is done to check for diseases that may not cause symptoms  The screening tests you may need depend on your age, gender, family history, and lifestyle habits  For example, colorectal screening may be recommended if you are 48years old or older  Screening tests you need if you are a woman:   · A Pap smear  is used to screen for cervical cancer   Pap smears are usually done every 3 to 5 years depending on your age  You may need them more often if you have had abnormal Pap smear test results in the past  Ask your healthcare provider how often you should have a Pap smear  · A mammogram  is an x-ray of your breasts to screen for breast cancer  Experts recommend mammograms every 2 years starting at age 48 years  You may need a mammogram at age 52 years or younger if you have an increased risk for breast cancer  Talk to your healthcare provider about when you should start having mammograms and how often you need them  Vaccines you may need:   · Get an influenza vaccine  every year  The influenza vaccine protects you from the flu  Several types of viruses cause the flu  The viruses change over time, so new vaccines are made each year  · Get a tetanus-diphtheria (Td) booster vaccine  every 10 years  This vaccine protects you against tetanus and diphtheria  Tetanus is a severe infection that may cause painful muscle spasms and lockjaw  Diphtheria is a severe bacterial infection that causes a thick covering in the back of your mouth and throat  · Get a human papillomavirus (HPV) vaccine  if you are female and aged 23 to 32 or male 23 to 24 and never received it  This vaccine protects you from HPV infection  HPV is the most common infection spread by sexual contact  HPV may also cause vaginal, penile, and anal cancers  · Get a pneumococcal vaccine  if you are aged 72 years or older  The pneumococcal vaccine is an injection given to protect you from pneumococcal disease  Pneumococcal disease is an infection caused by pneumococcal bacteria  The infection may cause pneumonia, meningitis, or an ear infection  · Get a shingles vaccine  if you are aged 61 or older, even if you have had shingles before  The shingles vaccine is an injection to protect you from the varicella-zoster virus  This is the same virus that causes chickenpox   Shingles is a painful rash that develops in people who had chickenpox or have been exposed to the virus  How to eat healthy:  My Plate is a model for planning healthy meals  It shows the types and amounts of foods that should go on your plate  Fruits and vegetables make up about half of your plate, and grains and protein make up the other half  A serving of dairy is included on the side of your plate  The amount of calories and serving sizes you need depends on your age, gender, weight, and height  Examples of healthy foods are listed below:  · Eat a variety of vegetables  such as dark green, red, and orange vegetables  You can also include canned vegetables low in sodium (salt) and frozen vegetables without added butter or sauces  · Eat a variety of fresh fruits , canned fruit in 100% juice, frozen fruit, and dried fruit  · Include whole grains  At least half of the grains you eat should be whole grains  Examples include whole-wheat bread, wheat pasta, brown rice, and whole-grain cereals such as oatmeal     · Eat a variety of protein foods such as seafood (fish and shellfish), lean meat, and poultry without skin (turkey and chicken)  Examples of lean meats include pork leg, shoulder, or tenderloin, and beef round, sirloin, tenderloin, and extra lean ground beef  Other protein foods include eggs and egg substitutes, beans, peas, soy products, nuts, and seeds  · Choose low-fat dairy products such as skim or 1% milk or low-fat yogurt, cheese, and cottage cheese  · Limit unhealthy fats  such as butter, hard margarine, and shortening  Exercise:  Exercise at least 30 minutes per day on most days of the week  Some examples of exercise include walking, biking, dancing, and swimming  You can also fit in more physical activity by taking the stairs instead of the elevator or parking farther away from stores  Include muscle strengthening activities 2 days each week  Regular exercise provides many health benefits   It helps you manage your weight, and decreases your risk for type 2 diabetes, heart disease, stroke, and high blood pressure  Exercise can also help improve your mood  Ask your healthcare provider about the best exercise plan for you  General health and safety guidelines:   · Do not smoke  Nicotine and other chemicals in cigarettes and cigars can cause lung damage  Ask your healthcare provider for information if you currently smoke and need help to quit  E-cigarettes or smokeless tobacco still contain nicotine  Talk to your healthcare provider before you use these products  · Limit alcohol  A drink of alcohol is 12 ounces of beer, 5 ounces of wine, or 1½ ounces of liquor  · Lose weight, if needed  Being overweight increases your risk of certain health conditions  These include heart disease, high blood pressure, type 2 diabetes, and certain types of cancer  · Protect your skin  Do not sunbathe or use tanning beds  Use sunscreen with a SPF 15 or higher  Apply sunscreen at least 15 minutes before you go outside  Reapply sunscreen every 2 hours  Wear protective clothing, hats, and sunglasses when you are outside  · Drive safely  Always wear your seatbelt  Make sure everyone in your car wears a seatbelt  A seatbelt can save your life if you are in an accident  Do not use your cell phone when you are driving  This could distract you and cause an accident  Pull over if you need to make a call or send a text message  · Practice safe sex  Use latex condoms if are sexually active and have more than one partner  Your healthcare provider may recommend screening tests for sexually transmitted infections (STIs)  · Wear helmets, lifejackets, and protective gear  Always wear a helmet when you ride a bike or motorcycle, go skiing, or play sports that could cause a head injury  Wear protective equipment when you play sports  Wear a lifejacket when you are on a boat or doing water sports    © 2017 [employer] Information is for End User's use only and may not be sold, redistributed or otherwise used for commercial purposes  All illustrations and images included in CareNotes® are the copyrighted property of [employer]  or Eduar Perez  The above information is an  only  It is not intended as medical advice for individual conditions or treatments  Talk to your doctor, nurse or pharmacist before following any medical regimen to see if it is safe and effective for you  Cholesterol and Your Health   AMBULATORY CARE:   Cholesterol  is a waxy, fat-like substance  Cholesterol is made by your body, but also comes from certain foods you eat  Your body uses cholesterol to make hormones and new cells  Your body also uses cholesterol to protect nerves  Cholesterol comes from foods such as meat and dairy products  Your total cholesterol level is made up by LDL cholesterol, HDL cholesterol, and triglycerides:  · LDL cholesterol  is called bad cholesterol  because it forms plaque in your arteries  As plaque builds up, your arteries become narrow, and less blood flows through  When plaque decreases blood flow to your heart, you may have chest pain  If plaque completely blocks an artery that bring blood to your heart, you may have a heart attack  Plaque can break off and form blood clots  Blood clots may block arteries in your brain and cause a stroke  · HDL cholesterol  is called good cholesterol  because it helps remove LDL cholesterol from your arteries  It does this by attaching to LDL cholesterol and carrying it to your liver  Your liver breaks down LDL cholesterol so your body can get rid of it  High levels of HDL cholesterol can help prevent a heart attack and stroke  Low levels of HDL cholesterol can increase your risk for heart disease, heart attack, and stroke  · Triglycerides  are a type of fat that store energy from foods you eat  High levels of triglycerides also cause plaque buildup   This can increase your risk for a heart attack or stroke  If your triglyceride level is high, your LDL cholesterol level may also be high  How food affects your cholesterol levels:   · Unhealthy fats  increase LDL cholesterol and triglyceride levels in your blood  They are found in foods high in cholesterol, saturated fat, and trans fat:     ¨ Cholesterol  is found in eggs, dairy, and meat  ¨ Saturated fat  is found in butter, cheese, ice cream, whole milk, and coconut oil  Saturated fat is also found in meat, such as sausage, hot dogs, and bologna  ¨ Trans fat  is found in liquid oils and is used in fried and baked foods  Foods that contain trans fats include chips, crackers, muffins, sweet rolls, microwave popcorn, and cookies  · Healthy fats,  also called unsaturated fats, help lower LDL cholesterol and triglyceride levels  Healthy fats include the following:     ¨ Monounsaturated fats  are found in foods such as olive oil, canola oil, avocado, nuts, and olives  ¨ Polyunsaturated fats,  such as omega 3 fats, are found in fish, such as salmon, trout, and tuna  They can also be found in plant foods such as flaxseed, walnuts, and soybeans  Other things that affect your cholesterol levels:   · Smoking cigarettes    · Being overweight or obese     · Drinking large amounts of alcohol    · Not enough exercise or no exercise    · Certain genes passed from your parents to you  What you need to know about having your cholesterol levels checked: Adults 21to 39years of age should have their cholesterol levels checked every 4 to 6 years  Adults 45 years and older should have their cholesterol checked every 1 to 2 years  You may need your cholesterol checked more often, or at a younger age, if you have risk factors for heart disease  You may also need to have your cholesterol checked more often if you have other health conditions, such as diabetes  Blood tests are used to check cholesterol levels   Blood tests measure your levels of triglycerides, LDL cholesterol, and HDL cholesterol  Cholesterol level goals: Your cholesterol level goal may depend on your risk for heart disease  It may also depend on your age and other health conditions  Ask your healthcare provider if the following goals are right for you:  · Your total cholesterol level  should be less than 200 mg/dL  This number may also depend on your HDL and LDL cholesterol goals  · Your LDL cholesterol level  should be less than 130 mg/dL  · Your HDL cholesterol level  should be 60 mg/dL or higher  · Your triglyceride level  should be less than 150 mg/dL  Treatment for high cholesterol:  Treatment for high cholesterol will also decrease your risk of heart disease, heart attack, and stroke  Treatment may include any of the following:  · Medicines  may be given to lower your LDL cholesterol, triglyceride levels, or total cholesterol level  You may need medicines to lower your cholesterol if any of the following is true:     ¨ You have a history of stroke, TIA, unstable angina, or a heart attack    ¨ Your LDL cholesterol level is 190 mg/dL or higher    ¨ You are age 36to 76years of age, have diabetes, and your LDL cholesterol is 70 mg/dL or higher    ¨ You are age 36to 76years of age, have risk factors for heart disease, and your LDL cholesterol is 70 mg/dL or higher    · Lifestyle changes  include changes to your diet, exercise, weight loss, and quitting smoking  It also includes decreasing the amount of alcohol you drink  · Supplements  include fish oil, red yeast rice, and garlic  Fish oil may help lower your triglyceride and LDL cholesterol levels  It may also increase your HDL cholesterol level  Red yeast rice may help decrease your total cholesterol level and LDL cholesterol level  Garlic may help lower your total cholesterol level  Do not take these supplements without talking to your healthcare provider     Nutrition to help lower your cholesterol levels:  A registered dietitian can help you create a healthy eating plan  Read food labels and choose foods low in saturated fat, trans fats, and cholesterol  · Decrease the total amount of fat you eat  Choose lean meats, fat-free or 1% fat milk, and low-fat dairy products, such as yogurt and cheese  Try to limit or avoid red meats  Limit or do not eat fried foods or baked goods such as cookies  · Replace unhealthy fats with healthy fats  Cook foods in olive oil or canola oil  Choose soft margarines that are low in saturated fat and trans fat  Seeds, nuts, and avocados are other examples of healthy fats  · Eat foods with omega-3 fats  Examples include salmon, tuna, mackerel, walnuts, and flaxseed  Eat fish 2 times per week  Children and pregnant women should not eat fish that have high levels of mercury, such as shark, swordfish, and perico mackerel  · Increase the amount of plant-based foods you eat  Plant-based foods are low in cholesterol and fat  Eating more of these foods may help lower your cholesterol and help you lose weight  Examples of plant-based foods includes fruits, vegetables, legumes, and whole grains  Replace milk that contains dairy with almond, soy, or coconut milk  Eat beans and foods with soy for protein instead of meat  Ask your healthcare provider or dietitian for more information on plant-based foods  · Increase the amount of fiber you eat  High-fiber foods can help lower your LDL cholesterol  You should eat between 20 and 30 grams of fiber each day  Eat at least 5 servings of fruits and vegetables each day  Other examples of high-fiber foods include whole-grain or whole-wheat breads, pastas, or cereals, and brown rice  Eat 3 ounces of whole-grain foods each day  Increase fiber slowly  You may have abdominal discomfort, bloating, and gas if you add fiber to your diet too quickly  Lifestyle changes you can make to help lower your cholesterol levels:   · Maintain a healthy weight  Ask your healthcare provider how much you should weigh  Ask him or her to help you create a weight loss plan if you are overweight  Weight loss can decrease your total cholesterol and triglyceride levels  · Exercise regularly  Exercise can help lower your total cholesterol level and maintain a healthy weight  Exercise can also help increase your HDL cholesterol level  Work with your healthcare provider to create an exercise program that is right for you  Get at least 30 minutes of moderate exercise most days of the week  Examples of exercise include brisk walking, swimming, or biking  · Do not smoke  Nicotine and other chemicals in cigarettes and cigars can damage your lungs, heart, and blood vessels  They can also raise your triglyceride levels  Ask your healthcare provider for information if you currently smoke and need help to quit  E-cigarettes or smokeless tobacco still contain nicotine  Talk to your healthcare provider before you use these products  · Limit or do not drink alcohol  Alcohol can increase your triglyceride levels  Ask your healthcare provider if it is safe for you to drink alcohol  Also ask how much is safe for you to drink each day  © 2017 [address] [facility] Information is for End User's use only and may not be sold, redistributed or otherwise used for commercial purposes  All illustrations and images included in CareNotes® are the copyrighted property of [employer]  or Eduar Perez  The above information is an  only  It is not intended as medical advice for individual conditions or treatments  Talk to your doctor, nurse or pharmacist before following any medical regimen to see if it is safe and effective for you

## 2019-05-14 NOTE — PROGRESS NOTES
PT DISCHARGE NOTE       18: Pt emailed PT on 18, responding to PT phone call regarding status  Pt stated he no longer has imbalance or dizziness issues and he will not return to PT  If dizziness returns he plans to return to PT  Several goals known to be met  DC from PT at this time  Goals  ST-4 weeks  1 ) Early independence with HEP for VOR 1 with VS and static tandem balance  MET  2 ) Decrease symptom reproduction by 50% with head tilt/turning  MET  3 ) Increased tandem balance with EC > 20 seconds  4 ) Decreased DHI score < 20/100  LT-8 weeks   1 ) Decreased DHI score < 10/100   2 ) Return to full speed ADL's   3 ) Return to full speed recreational activity of biking with no symptom reproduction  MET  4 ) Increased tandem standing balance with EC > 30 sec  Today's date: 2018 Daily Note  Patient name: Sergey Clifford  :   MRN: [id number]  Referring provider: Maye Garcia MD  Dx:   Encounter Diagnosis     ICD-10-CM    1  Unilateral vestibular weakness, right H81 8X1    2  Vertigo R42        Start Time: 1246  Stop Time: 4110  Total time in clinic (min): 69 minutes    Subjective: Pt reports having an episode on Friday, not drastic, but lasting a while  Sleeping overnight did not take care of it, which is the usual course  Symptoms were "sea-sick," and significant tinnitus  He denied fluctuations in hearing in this time frame, but struggles to hear when the tinnitus is loud  He exercised vigorously the day before, running in a 5K  Current sx: no dizziness  Objective: See treatment diary below  Precautions standard    Specialty Daily Treatment Diary     Manual                                                     Exercise Diary  18      VOR 1 with VS Contrast added to HEP  FT on FOAM  90 sec  2 reps  No sx       VOR 2   Foam  FT 90 sec  VCS  Sx w/H only  No sx w/V         Tandem stance EO/EC 20 sec, 5 reps        Tandem stance EO/EO with HT 20 reps Scheduled for:  Colonoscopy 69221  Provider Name: Dr. Gigi Graham  Date:  6/24/19  Location:  [facility]  Sedation:  IV  Time:  9:15 am, arrival 8:15 am  Prep: Trilyte  Meds/Allergies Reconciled?:  Elsi reviewed  Diagnosis with codes:  Screening Z12.11, Hx of polyps Z86. each, H & V  Tandem walk  With HT         Foam with HT EO/EC        Gait with HT , diagnonals   60 sec each  UR, DL  UL, DR  No sx  Gait with VOR 1   1 90 sec H  No sx   2  For/rev gait  VCS 90 sec       Gait with VOR 2   1  For/rev 90 sec  VCS mild sx during H    2  V 90 sec,   Very mild sx  [facility]  from foam   15 reps 2 sets  L/R No sx               VOR CX w/gait   90 sec  No sx  VOR x2   Side step beam   90 sec H  No dizziness                                                           Modalities                                      Assessment: Tolerated treatment well  Patient would benefit from continued PT  VOR 2 with for/rev gait and visual contrast background added to HEP, along with tandem gait with head turns,see copies in media  VORx2 as above was the only activity to provoke sx this visit  Plan: Continue per plan of care  Pt will call to report any patterns he finds in provocation of sx  NV progress visuo-vestibularactivity

## 2019-10-02 ENCOUNTER — CONSULT (OUTPATIENT)
Dept: GASTROENTEROLOGY | Facility: AMBULARY SURGERY CENTER | Age: 64
End: 2019-10-02
Payer: COMMERCIAL

## 2019-10-02 VITALS
HEART RATE: 72 BPM | WEIGHT: 173.6 LBS | DIASTOLIC BLOOD PRESSURE: 72 MMHG | RESPIRATION RATE: 16 BRPM | TEMPERATURE: 98.5 F | HEIGHT: 70 IN | BODY MASS INDEX: 24.85 KG/M2 | SYSTOLIC BLOOD PRESSURE: 124 MMHG

## 2019-10-02 DIAGNOSIS — Z12.11 SCREENING FOR COLON CANCER: ICD-10-CM

## 2019-10-02 DIAGNOSIS — Z80.0 FAMILY HISTORY OF COLON CANCER: Primary | ICD-10-CM

## 2019-10-02 PROCEDURE — 99203 OFFICE O/P NEW LOW 30 MIN: CPT | Performed by: INTERNAL MEDICINE

## 2019-10-02 NOTE — ASSESSMENT & PLAN NOTE
Patient is at increased risk for colon cancer screening  Rule out colorectal lesions including polyps or malignancy     -Schedule for colonoscopy  -High-fiber diet     -Patient was given instructions about the colonoscopy prep     -Patient was explained about  the risks and benefits of the procedure  Risks including but not limited to bleeding, infection, perforation were explained in detail  Also explained about less than 100% sensitivity with the exam and other alternatives

## 2019-10-02 NOTE — PROGRESS NOTES
Consultation - [facility]  Demetrio Cote 1955 male         Chief Complaint:  For colonoscopy    HPI:  81-year-old male with no significant past medical history was referred for colonoscopy  His mother had colon cancer and his last colonoscopy was 5 years ago  Patient has regular bowel movements and denies any blood or mucus in the stool  Appetite is good and denies any recent weight loss  Denies any abdominal pain, nausea, or vomiting  Has no heartburn or acid reflux  Denies any difficulty swallowing  REVIEW OF SYSTEMS: Review of Systems   Constitutional: Negative for activity change, appetite change, chills, diaphoresis, fatigue, fever and unexpected weight change  HENT: Negative for ear discharge, ear pain, facial swelling, hearing loss, nosebleeds, sore throat, tinnitus and voice change  Eyes: Negative for pain, discharge, redness, itching and visual disturbance  Respiratory: Negative for apnea, cough, chest tightness, shortness of breath and wheezing  Cardiovascular: Negative for chest pain and palpitations  Gastrointestinal:        As noted in HPI   Endocrine: Negative for cold intolerance, heat intolerance and polyuria  Genitourinary: Negative for difficulty urinating, dysuria, flank pain, hematuria and urgency  Musculoskeletal: Negative for arthralgias, back pain, gait problem, joint swelling and myalgias  Skin: Negative for rash and wound  Neurological: Negative for dizziness, tremors, seizures, speech difficulty, light-headedness, numbness and headaches  Hematological: Negative for adenopathy  Does not bruise/bleed easily  Psychiatric/Behavioral: Negative for agitation, behavioral problems and confusion  The patient is not nervous/anxious           Past Medical History:   Diagnosis Date    Squamous cell skin cancer     left forearm      Past Surgical History:   Procedure Laterality Date    HERNIA REPAIR Bilateral     inguinal     Social History Socioeconomic History    Marital status: /Civil Union     Spouse name: Not on file    Number of children: Not on file    Years of education: Not on file    Highest education level: Not on file   Occupational History    Occupation: pharmacist   Social Needs    Financial resource strain: Not on file    Food insecurity:     Worry: Not on file     Inability: Not on file    Transportation needs:     Medical: Not on file     Non-medical: Not on file   Tobacco Use    Smoking status: Never Smoker    Smokeless tobacco: Never Used   Substance and Sexual Activity    Alcohol use: Yes     Alcohol/week: 10 0 standard drinks     Types: 10 Cans of beer per week     Frequency: 2-3 times a week     Drinks per session: 1 or 2     Binge frequency: Never    Drug use: No    Sexual activity: Not on file   Lifestyle    Physical activity:     Days per week: Not on file     Minutes per session: Not on file    Stress: Not on file   Relationships    Social connections:     Talks on phone: Not on file     Gets together: Not on file     Attends Mu-ism service: Not on file     Active member of club or organization: Not on file     Attends meetings of clubs or organizations: Not on file     Relationship status: Not on file    Intimate partner violence:     Fear of current or ex partner: Not on file     Emotionally abused: Not on file     Physically abused: Not on file     Forced sexual activity: Not on file   Other Topics Concern    Not on file   Social History Narrative         Family History   Problem Relation Age of Onset    Cancer Mother         colon, diagnosed in her 42's    Stroke Father         CVA    Hypertension Father     Hypertension Brother      Patient has no known allergies    Current Outpatient Medications   Medication Sig Dispense Refill    Na Sulfate-K Sulfate-Mg Sulf (SUPREP BOWEL PREP KIT) 17 5-3 13-1 6 GM/177ML SOLN Take 2 Bottles by mouth see administration instructions Please follow the instructions from the office 2 Bottle 0     No current facility-administered medications for this visit  Blood pressure 124/72, pulse 72, temperature 98 5 °F (36 9 °C), temperature source Tympanic, resp  rate 16, height 5' 10" (1 778 m), weight 78 7 kg (173 lb 9 6 oz)  PHYSICAL EXAM: Physical Exam   Constitutional: He is oriented to person, place, and time  He appears well-developed  HENT:   Head: Normocephalic and atraumatic  Mouth/Throat: Oropharynx is clear and moist    Eyes: Pupils are equal, round, and reactive to light  Conjunctivae are normal  Right eye exhibits no discharge  Left eye exhibits no discharge  No scleral icterus  Neck: Neck supple  No JVD present  No tracheal deviation present  No thyromegaly present  Cardiovascular: Normal rate, regular rhythm, normal heart sounds and intact distal pulses  Exam reveals no gallop and no friction rub  No murmur heard  Pulmonary/Chest: Effort normal and breath sounds normal  No respiratory distress  He has no wheezes  He has no rales  He exhibits no tenderness  Abdominal: Soft  Bowel sounds are normal  He exhibits no distension and no mass  There is no tenderness  There is no rebound and no guarding  No hernia  Musculoskeletal: He exhibits no edema  Lymphadenopathy:     He has no cervical adenopathy  Neurological: He is alert and oriented to person, place, and time  Skin: Skin is warm and dry  No rash noted  No erythema  Psychiatric: He has a normal mood and affect  His behavior is normal  Thought content normal         No results found for: WBC, HGB, HCT, MCV, PLT  No results found for: GLUCOSE, CALCIUM, NA, K, CO2, CL, BUN, CREATININE  No results found for: ALT, AST, GGT, ALKPHOS, BILITOT  No results found for: INR, PROTIME    Mri Brain W Wo Contrast    Result Date: 3/26/2018  Impression: Normal MRI of the brain and IACs  Scattered paranasal sinus mucosal thickening   Workstation performed: [id number]       ASSESSMENT & PLAN:    Family history of colon cancer  Patient is at increased risk for colon cancer screening  Rule out colorectal lesions including polyps or malignancy     -Schedule for colonoscopy  -High-fiber diet     -Patient was given instructions about the colonoscopy prep     -Patient was explained about  the risks and benefits of the procedure  Risks including but not limited to bleeding, infection, perforation were explained in detail  Also explained about less than 100% sensitivity with the exam and other alternatives

## 2019-10-29 ENCOUNTER — HOSPITAL ENCOUNTER (OUTPATIENT)
Dept: MRI IMAGING | Facility: HOSPITAL | Age: 64
Discharge: HOME | End: 2019-10-29
Attending: OTOLARYNGOLOGY
Payer: COMMERCIAL

## 2019-10-29 DIAGNOSIS — IMO0001 RIGHT ASYMMETRICAL SNHL: ICD-10-CM

## 2019-10-29 PROCEDURE — 70553 MRI BRAIN STEM W/O & W/DYE: CPT

## 2019-10-29 PROCEDURE — A9585 GADOBUTROL INJECTION: HCPCS | Performed by: OTOLARYNGOLOGY

## 2019-10-29 RX ADMIN — GADOBUTROL 7 ML: 604.72 INJECTION INTRAVENOUS at 07:30

## 2019-11-12 PROBLEM — IMO0001 RIGHT ASYMMETRICAL SNHL: Status: ACTIVE | Noted: 2019-11-12

## 2019-11-21 ENCOUNTER — ANESTHESIA (OUTPATIENT)
Dept: ANESTHESIOLOGY | Facility: HOSPITAL | Age: 64
End: 2019-11-21

## 2019-11-21 ENCOUNTER — ANESTHESIA EVENT (OUTPATIENT)
Dept: ANESTHESIOLOGY | Facility: HOSPITAL | Age: 64
End: 2019-11-21

## 2019-12-04 ENCOUNTER — ANESTHESIA EVENT (OUTPATIENT)
Dept: GASTROENTEROLOGY | Facility: AMBULARY SURGERY CENTER | Age: 64
End: 2019-12-04

## 2019-12-05 ENCOUNTER — HOSPITAL ENCOUNTER (OUTPATIENT)
Dept: GASTROENTEROLOGY | Facility: AMBULARY SURGERY CENTER | Age: 64
Setting detail: OUTPATIENT SURGERY
Discharge: HOME | End: 2019-12-05
Attending: INTERNAL MEDICINE
Payer: COMMERCIAL

## 2019-12-05 ENCOUNTER — ANESTHESIA (OUTPATIENT)
Dept: GASTROENTEROLOGY | Facility: AMBULARY SURGERY CENTER | Age: 64
End: 2019-12-05

## 2019-12-05 VITALS
HEIGHT: 70 IN | DIASTOLIC BLOOD PRESSURE: 87 MMHG | HEART RATE: 65 BPM | RESPIRATION RATE: 18 BRPM | OXYGEN SATURATION: 96 % | WEIGHT: 167 LBS | BODY MASS INDEX: 23.91 KG/M2 | TEMPERATURE: 98.2 F | SYSTOLIC BLOOD PRESSURE: 130 MMHG

## 2019-12-05 DIAGNOSIS — Z80.0 FAMILY HISTORY OF COLON CANCER: ICD-10-CM

## 2019-12-05 PROCEDURE — 45385 COLONOSCOPY W/LESION REMOVAL: CPT | Performed by: INTERNAL MEDICINE

## 2019-12-05 PROCEDURE — 45380 COLONOSCOPY AND BIOPSY: CPT | Performed by: INTERNAL MEDICINE

## 2019-12-05 PROCEDURE — 88305 TISSUE EXAM BY PATHOLOGIST: CPT | Performed by: PATHOLOGY

## 2019-12-05 RX ORDER — SODIUM CHLORIDE, SODIUM LACTATE, POTASSIUM CHLORIDE, CALCIUM CHLORIDE 600; 310; 30; 20 MG/100ML; MG/100ML; MG/100ML; MG/100ML
125 INJECTION, SOLUTION INTRAVENOUS CONTINUOUS
Status: DISCONTINUED | OUTPATIENT
Start: 2019-12-05 | End: 2019-12-09 | Stop reason: HOSPADM

## 2019-12-05 RX ORDER — LIDOCAINE HYDROCHLORIDE 10 MG/ML
INJECTION, SOLUTION INFILTRATION; PERINEURAL AS NEEDED
Status: DISCONTINUED | OUTPATIENT
Start: 2019-12-05 | End: 2019-12-05 | Stop reason: SURG

## 2019-12-05 RX ORDER — LIDOCAINE HYDROCHLORIDE 10 MG/ML
0.5 INJECTION, SOLUTION EPIDURAL; INFILTRATION; INTRACAUDAL; PERINEURAL ONCE AS NEEDED
Status: DISCONTINUED | OUTPATIENT
Start: 2019-12-05 | End: 2019-12-09 | Stop reason: HOSPADM

## 2019-12-05 RX ORDER — PROPOFOL 10 MG/ML
INJECTION, EMULSION INTRAVENOUS AS NEEDED
Status: DISCONTINUED | OUTPATIENT
Start: 2019-12-05 | End: 2019-12-05 | Stop reason: SURG

## 2019-12-05 RX ADMIN — PROPOFOL 10 MG: 10 INJECTION, EMULSION INTRAVENOUS at 09:24

## 2019-12-05 RX ADMIN — PROPOFOL 100 MG: 10 INJECTION, EMULSION INTRAVENOUS at 09:15

## 2019-12-05 RX ADMIN — PROPOFOL 20 MG: 10 INJECTION, EMULSION INTRAVENOUS at 09:16

## 2019-12-05 RX ADMIN — PROPOFOL 20 MG: 10 INJECTION, EMULSION INTRAVENOUS at 09:21

## 2019-12-05 RX ADMIN — SODIUM CHLORIDE, SODIUM LACTATE, POTASSIUM CHLORIDE, AND CALCIUM CHLORIDE 125 ML/HR: .6; .31; .03; .02 INJECTION, SOLUTION INTRAVENOUS at 08:50

## 2019-12-05 RX ADMIN — PROPOFOL 30 MG: 10 INJECTION, EMULSION INTRAVENOUS at 09:17

## 2019-12-05 RX ADMIN — LIDOCAINE HYDROCHLORIDE 20 MG: 10 INJECTION, SOLUTION INFILTRATION; PERINEURAL at 09:15

## 2019-12-05 NOTE — ANESTHESIA POSTPROCEDURE EVALUATION
Post-Op Assessment Note    CV Status:  Stable       Mental Status:  Alert and awake   Hydration Status:  Stable   PONV Controlled:  None   Airway Patency:  Patent   Post Op Vitals Reviewed: Yes      Staff: CRNA           /78 (12/05/19 0932)    Temp 98 2 °F (36 8 °C) (12/05/19 0932)    Pulse 61 (12/05/19 0932)   Resp 16 (12/05/19 0932)    SpO2 96 % (12/05/19 0932)    Post procedure VS noted above, SV non obstructed on RA

## 2019-12-05 NOTE — H&P
History and Physical - [facility]  Barb Orion 60 y o  male MRN: [id number]        HPI:  72-year-old male was referred for colonoscopy because of family history of colon cancer in his mother  Regular bowel movements and denies any blood or mucus in the stool  Historical Information   Past Medical History:   Diagnosis Date    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    Squamous cell skin cancer     left forearm     Past Surgical History:   Procedure Laterality Date    COLONOSCOPY      HERNIA REPAIR Bilateral     inguinal     Social History   Social History     Substance and Sexual Activity   Alcohol Use Yes    Alcohol/week: 10 0 standard drinks    Types: 10 Cans of beer per week    Frequency: 2-3 times a week    Drinks per session: 1 or 2    Binge frequency: Never     Social History     Substance and Sexual Activity   Drug Use No     Social History     Tobacco Use   Smoking Status Never Smoker   Smokeless Tobacco Never Used     Family History   Problem Relation Age of Onset    Cancer Mother         colon, diagnosed in her 42's    Stroke Father         CVA    Hypertension Father     Hypertension Brother        Meds/Allergies       (Not in a hospital admission)    No Known Allergies    Objective     Blood pressure (!) 171/99, pulse 69, temperature 97 7 °F (36 5 °C), temperature source Temporal, resp  rate 18, height 5' 10" (1 778 m), weight 75 8 kg (167 lb), SpO2 100 %      PHYSICAL EXAM:    Gen: NAD  CV: S1 & S2 normal, RRR  CHEST: Clear to auscultate  ABD: soft, NT/ND, good bowel sounds  EXT: no edema    ASSESSMENT:     Family history of colon cancer    PLAN:    Colonoscopy

## 2019-12-05 NOTE — ANESTHESIA PREPROCEDURE EVALUATION
Review of Systems/Medical History  Patient summary reviewed  Chart reviewed  No history of anesthetic complications     Cardiovascular  EKG reviewed, Hyperlipidemia,    Pulmonary       GI/Hepatic    Bowel prep            Endo/Other     GYN       Hematology   Musculoskeletal       Neurology      Comment: Sensorineural hearing loss Psychology           Physical Exam    Airway    Mallampati score: I  TM Distance: >3 FB  Neck ROM: full     Dental   No notable dental hx     Cardiovascular      Pulmonary      Other Findings        Anesthesia Plan  ASA Score- 2     Anesthesia Type- IV sedation with anesthesia with ASA Monitors  Additional Monitors:   Airway Plan:         Plan Factors-  Patient did not smoke on day of surgery  Induction-     Postoperative Plan-     Informed Consent- Anesthetic plan and risks discussed with patient  I personally reviewed this patient with the CRNA  Discussed and agreed on the Anesthesia Plan with the CRNA  Yahir Rosales

## 2019-12-18 ENCOUNTER — TELEPHONE (OUTPATIENT)
Dept: GASTROENTEROLOGY | Facility: AMBULARY SURGERY CENTER | Age: 64
End: 2019-12-18

## 2019-12-18 NOTE — TELEPHONE ENCOUNTER
----- Message from Meera Santana MD sent at 12/14/2019 12:54 PM EST -----    Colon polyps removed came back as precancerous tubular adenoma  Next colonoscopy in 3 years  Patient to call our office for any GI symptoms

## 2020-03-07 ENCOUNTER — TELEPHONE (OUTPATIENT)
Dept: OTHER | Facility: OTHER | Age: 65
End: 2020-03-07

## 2020-12-17 DIAGNOSIS — Z12.5 SCREENING FOR PROSTATE CANCER: ICD-10-CM

## 2020-12-17 DIAGNOSIS — E78.2 MIXED HYPERLIPIDEMIA: Primary | ICD-10-CM

## 2020-12-17 DIAGNOSIS — E83.51 HYPOCALCEMIA: ICD-10-CM

## 2021-01-07 ENCOUNTER — OFFICE VISIT (OUTPATIENT)
Dept: INTERNAL MEDICINE CLINIC | Facility: CLINIC | Age: 66
End: 2021-01-07
Payer: COMMERCIAL

## 2021-01-07 VITALS
TEMPERATURE: 97.6 F | DIASTOLIC BLOOD PRESSURE: 80 MMHG | SYSTOLIC BLOOD PRESSURE: 164 MMHG | HEART RATE: 60 BPM | BODY MASS INDEX: 24.62 KG/M2 | WEIGHT: 172 LBS | OXYGEN SATURATION: 96 % | HEIGHT: 70 IN

## 2021-01-07 DIAGNOSIS — R03.0 ELEVATED BP WITHOUT DIAGNOSIS OF HYPERTENSION: ICD-10-CM

## 2021-01-07 DIAGNOSIS — Z00.00 ANNUAL PHYSICAL EXAM: Primary | ICD-10-CM

## 2021-01-07 DIAGNOSIS — R19.09 GROIN LUMP: ICD-10-CM

## 2021-01-07 DIAGNOSIS — Z23 ENCOUNTER FOR IMMUNIZATION: ICD-10-CM

## 2021-01-07 PROCEDURE — 90471 IMMUNIZATION ADMIN: CPT | Performed by: INTERNAL MEDICINE

## 2021-01-07 PROCEDURE — 90750 HZV VACC RECOMBINANT IM: CPT | Performed by: INTERNAL MEDICINE

## 2021-01-07 PROCEDURE — 99396 PREV VISIT EST AGE 40-64: CPT | Performed by: INTERNAL MEDICINE

## 2021-01-07 NOTE — ASSESSMENT & PLAN NOTE
-R inguinal area with remote h/o hernia repair  -advised avoiding activities that would increase intra-abdominal pressure  -would recommend u/s but is returning to [place] in a few days

## 2021-01-07 NOTE — PATIENT INSTRUCTIONS
Wellness Visit for Adults   AMBULATORY CARE:   A wellness visit  is when you see your healthcare provider to get screened for health problems  Your healthcare provider will also give you advice on how to stay healthy  Write down your questions so you remember to ask them  Ask your healthcare provider how often you should have a wellness visit  What happens at a wellness visit:  Your healthcare provider will ask about your health, and your family history of health problems  This includes high blood pressure, heart disease, and cancer  He or she will ask if you have symptoms that concern you, if you smoke, and about your mood  You may also be asked about your intake of medicines, supplements, food, and alcohol  Any of the following may be done:  · Your weight  will be checked  Your height may also be checked so your body mass index (BMI) can be calculated  Your BMI shows if you are at a healthy weight  · Your blood pressure  and heart rate will be checked  Your temperature may also be checked  · Blood and urine tests  may be done  Blood tests may be done to check your cholesterol levels  Abnormal cholesterol levels increase your risk for heart disease and stroke  You may also need a blood or urine test to check for diabetes if you are at increased risk  Urine tests may be done to look for signs of an infection or kidney disease  · A physical exam  includes checking your heartbeat and lungs with a stethoscope  Your healthcare provider may also check your skin to look for sun damage  · Screening tests  may be recommended  A screening test is done to check for diseases that may not cause symptoms  The screening tests you may need depend on your age, gender, family history, and lifestyle habits  For example, colorectal screening may be recommended if you are 48years old or older  Screening tests you need if you are a woman:   · A Pap smear  is used to screen for cervical cancer   Pap smears are usually done every 3 to 5 years depending on your age  You may need them more often if you have had abnormal Pap smear test results in the past  Ask your healthcare provider how often you should have a Pap smear  · A mammogram  is an x-ray of your breasts to screen for breast cancer  Experts recommend mammograms every 2 years starting at age 48 years  You may need a mammogram at age 52 years or younger if you have an increased risk for breast cancer  Talk to your healthcare provider about when you should start having mammograms and how often you need them  Vaccines you may need:   · Get an influenza vaccine  every year  The influenza vaccine protects you from the flu  Several types of viruses cause the flu  The viruses change over time, so new vaccines are made each year  · Get a tetanus-diphtheria (Td) booster vaccine  every 10 years  This vaccine protects you against tetanus and diphtheria  Tetanus is a severe infection that may cause painful muscle spasms and lockjaw  Diphtheria is a severe bacterial infection that causes a thick covering in the back of your mouth and throat  · Get a human papillomavirus (HPV) vaccine  if you are female and aged 23 to 32 or male 23 to 24 and never received it  This vaccine protects you from HPV infection  HPV is the most common infection spread by sexual contact  HPV may also cause vaginal, penile, and anal cancers  · Get a pneumococcal vaccine  if you are aged 72 years or older  The pneumococcal vaccine is an injection given to protect you from pneumococcal disease  Pneumococcal disease is an infection caused by pneumococcal bacteria  The infection may cause pneumonia, meningitis, or an ear infection  · Get a shingles vaccine  if you are 60 or older, even if you have had shingles before  The shingles vaccine is an injection to protect you from the varicella-zoster virus  This is the same virus that causes chickenpox   Shingles is a painful rash that develops in people who had chickenpox or have been exposed to the virus  How to eat healthy:  My Plate is a model for planning healthy meals  It shows the types and amounts of foods that should go on your plate  Fruits and vegetables make up about half of your plate, and grains and protein make up the other half  A serving of dairy is included on the side of your plate  The amount of calories and serving sizes you need depends on your age, gender, weight, and height  Examples of healthy foods are listed below:  · Eat a variety of vegetables  such as dark green, red, and orange vegetables  You can also include canned vegetables low in sodium (salt) and frozen vegetables without added butter or sauces  · Eat a variety of fresh fruits , canned fruit in 100% juice, frozen fruit, and dried fruit  · Include whole grains  At least half of the grains you eat should be whole grains  Examples include whole-wheat bread, wheat pasta, brown rice, and whole-grain cereals such as oatmeal     · Eat a variety of protein foods such as seafood (fish and shellfish), lean meat, and poultry without skin (turkey and chicken)  Examples of lean meats include pork leg, shoulder, or tenderloin, and beef round, sirloin, tenderloin, and extra lean ground beef  Other protein foods include eggs and egg substitutes, beans, peas, soy products, nuts, and seeds  · Choose low-fat dairy products such as skim or 1% milk or low-fat yogurt, cheese, and cottage cheese  · Limit unhealthy fats  such as butter, hard margarine, and shortening  Exercise:  Exercise at least 30 minutes per day on most days of the week  Some examples of exercise include walking, biking, dancing, and swimming  You can also fit in more physical activity by taking the stairs instead of the elevator or parking farther away from stores  Include muscle strengthening activities 2 days each week  Regular exercise provides many health benefits   It helps you manage your weight, and decreases your risk for type 2 diabetes, heart disease, stroke, and high blood pressure  Exercise can also help improve your mood  Ask your healthcare provider about the best exercise plan for you  General health and safety guidelines:   · Do not smoke  Nicotine and other chemicals in cigarettes and cigars can cause lung damage  Ask your healthcare provider for information if you currently smoke and need help to quit  E-cigarettes or smokeless tobacco still contain nicotine  Talk to your healthcare provider before you use these products  · Limit alcohol  A drink of alcohol is 12 ounces of beer, 5 ounces of wine, or 1½ ounces of liquor  · Lose weight, if needed  Being overweight increases your risk of certain health conditions  These include heart disease, high blood pressure, type 2 diabetes, and certain types of cancer  · Protect your skin  Do not sunbathe or use tanning beds  Use sunscreen with a SPF 15 or higher  Apply sunscreen at least 15 minutes before you go outside  Reapply sunscreen every 2 hours  Wear protective clothing, hats, and sunglasses when you are outside  · Drive safely  Always wear your seatbelt  Make sure everyone in your car wears a seatbelt  A seatbelt can save your life if you are in an accident  Do not use your cell phone when you are driving  This could distract you and cause an accident  Pull over if you need to make a call or send a text message  · Practice safe sex  Use latex condoms if are sexually active and have more than one partner  Your healthcare provider may recommend screening tests for sexually transmitted infections (STIs)  · Wear helmets, lifejackets, and protective gear  Always wear a helmet when you ride a bike or motorcycle, go skiing, or play sports that could cause a head injury  Wear protective equipment when you play sports  Wear a lifejacket when you are on a boat or doing water sports      © Copyright [employer] 2020 Information is for End User's use only and may not be sold, redistributed or otherwise used for commercial purposes  All illustrations and images included in CareNotes® are the copyrighted property of [employer]  or Bessy Anderson  The above information is an  only  It is not intended as medical advice for individual conditions or treatments  Talk to your doctor, nurse or pharmacist before following any medical regimen to see if it is safe and effective for you  Cholesterol and Your Health   AMBULATORY CARE:   Cholesterol  is a waxy, fat-like substance  Your body uses cholesterol to make hormones and new cells, and to protect nerves  Cholesterol is made by your body  It also comes from certain foods you eat, such as meat and dairy products  Your healthcare provider can help you set goals for your cholesterol levels  He or she can help you create a plan to meet your goals  Cholesterol level goals: Your cholesterol level goals depend on your risk for heart disease, your age, and your other health conditions  The following are general guidelines:  · Total cholesterol  includes low-density lipoprotein (LDL), high-density lipoprotein (HDL), and triglyceride levels  The total cholesterol level should be lower than 200 mg/dL and is best at about 150 mg/dL  · LDL cholesterol  is called bad cholesterol  because it forms plaque in your arteries  As plaque builds up, your arteries become narrow, and less blood flows through  When plaque decreases blood flow to your heart, you may have chest pain  If plaque completely blocks an artery that brings blood to your heart, you may have a heart attack  Plaque can break off and form blood clots  Blood clots may block arteries in your brain and cause a stroke  The level should be less than 130 mg/dL and is best at about 100 mg/dL  · HDL cholesterol  is called good cholesterol  because it helps remove LDL cholesterol from your arteries   It does this by attaching to LDL cholesterol and carrying it to your liver  Your liver breaks down LDL cholesterol so your body can get rid of it  High levels of HDL cholesterol can help prevent a heart attack and stroke  Low levels of HDL cholesterol can increase your risk for heart disease, heart attack, and stroke  The level should be 60 mg/dL or higher  · Triglycerides  are a type of fat that store energy from foods you eat  High levels of triglycerides also cause plaque buildup  This can increase your risk for a heart attack or stroke  If your triglyceride level is high, your LDL cholesterol level may also be high  The level should be less than 150 mg/dL  What increases your risk for high cholesterol:   · Smoking cigarettes    · Being overweight or obese, or not getting enough exercise    · Drinking large amounts of alcohol    · A medical condition such as hypertension (high blood pressure) or diabetes    · Certain genes passed from your parents to you    · Age older than 65 years    What you need to know about having your cholesterol levels checked: Adults 21to 39years of age should have their cholesterol levels checked every 4 to 6 years  Adults 45 years or older should have their cholesterol checked every 1 to 2 years  You may need your cholesterol checked more often, or at a younger age, if you have risk factors for heart disease  You may also need to have your cholesterol checked more often if you have other health conditions, such as diabetes  Blood tests are used to check cholesterol levels  Blood tests measure your levels of triglycerides, LDL cholesterol, and HDL cholesterol  How healthy fats affect your cholesterol levels:  Healthy fats, also called unsaturated fats, help lower LDL cholesterol and triglyceride levels  Healthy fats include the following:  · Monounsaturated fats  are found in foods such as olive oil, canola oil, avocado, nuts, and olives      · Polyunsaturated fats,  such as omega 3 fats, are found in fish, such as salmon, trout, and tuna  They can also be found in plant foods such as flaxseed, walnuts, and soybeans  How unhealthy fats affect your cholesterol levels:  Unhealthy fats increase LDL cholesterol and triglyceride levels  They are found in foods high in cholesterol, saturated fat, and trans fat:  · Cholesterol  is found in eggs, dairy, and meat  · Saturated fat  is found in butter, cheese, ice cream, whole milk, and coconut oil  Saturated fat is also found in meat, such as sausage, hot dogs, and bologna  · Trans fat  is found in liquid oils and is used in fried and baked foods  Foods that contain trans fats include chips, crackers, muffins, sweet rolls, microwave popcorn, and cookies  Treatment  for high cholesterol will also decrease your risk of heart disease, heart attack, and stroke  Treatment may include any of the following:  · Lifestyle changes  may include food, exercise, weight loss, and quitting smoking  You may also need to decrease the amount of alcohol you drink  Your healthcare provider will want you to start with lifestyle changes  Other treatment may be added if lifestyle changes are not enough  · Medicines  may be given to lower your LDL cholesterol, triglyceride levels, or total cholesterol level  You may need medicines to lower your cholesterol if any of the following is true:     ? You have a history of stroke, TIA, unstable angina, or a heart attack  ? Your LDL cholesterol level is 190 mg/dL or higher  ? You are age 36 to 76 years, have diabetes or heart disease risk factors, and your LDL cholesterol is 70 mg/dL or higher  · Supplements  include fish oil, red yeast rice, and garlic  Fish oil may help lower your triglyceride and LDL cholesterol levels  It may also increase your HDL cholesterol level  Red yeast rice may help decrease your total cholesterol level and LDL cholesterol level  Garlic may help lower your total cholesterol level   Do not take these supplements without talking to your healthcare provider  Food changes you can make to lower your cholesterol levels:  A dietitian can help you create a healthy eating plan  He or she can show you how to read food labels and choose foods low in saturated fat, trans fats, and cholesterol  · Decrease the total amount of fat you eat  Choose lean meats, fat-free or 1% fat milk, and low-fat dairy products, such as yogurt and cheese  Try to limit or avoid red meats  Limit or do not eat fried foods or baked goods, such as cookies  · Replace unhealthy fats with healthy fats  Cook foods in olive oil or canola oil  Choose soft margarines that are low in saturated fat and trans fat  Seeds, nuts, and avocados are other examples of healthy fats  · Eat foods with omega-3 fats  Examples include salmon, tuna, mackerel, walnuts, and flaxseed  Eat fish 2 times per week  Pregnant women should not eat fish that have high levels of mercury, such as shark, swordfish, and perico mackerel  · Increase the amount of high-fiber foods you eat  High-fiber foods can help lower your LDL cholesterol  Aim to get between 20 and 30 grams of fiber each day  Fruits and vegetables are high in fiber  Eat at least 5 servings each day  Other high-fiber foods are whole-grain or whole-wheat breads, pastas, or cereals, and brown rice  Eat 3 ounces of whole-grain foods each day  Increase fiber slowly  You may have abdominal discomfort, bloating, and gas if you add fiber to your diet too quickly  · Eat healthy protein foods  Examples include low-fat dairy products, skinless chicken and turkey, fish, and nuts  · Limit foods and drinks that are high in sugar  Your dietitian or healthcare provider can help you create daily limits for high-sugar foods and drinks  The limit may be lower if you have diabetes or another health condition  Limits can also help you lose weight if needed    Lifestyle changes you can make to lower your cholesterol levels:   · Maintain a healthy weight  Ask your healthcare provider what a healthy weight is for you  Ask him or her to help you create a weight loss plan if needed  Weight loss can decrease your total cholesterol and triglyceride levels  Weight loss may also help keep your blood pressure at a healthy level  · Exercise regularly  Exercise can help lower your total cholesterol level and maintain a healthy weight  Exercise can also help increase your HDL cholesterol level  Work with your healthcare provider to create an exercise program that is right for you  Get at least 30 to 40 minutes of moderate exercise most days of the week  Examples of exercise include brisk walking, swimming, or biking  · Do not smoke  Nicotine and other chemicals in cigarettes and cigars can raise your cholesterol levels  Ask your healthcare provider for information if you currently smoke and need help to quit  E-cigarettes or smokeless tobacco still contain nicotine  Talk to your healthcare provider before you use these products  · Limit or do not drink alcohol  Alcohol can increase your triglyceride levels  Ask your healthcare provider before you drink alcohol  Ask how much is okay for you to drink in 1 day or 1 week  © Copyright [address] Information is for End User's use only and may not be sold, redistributed or otherwise used for commercial purposes  All illustrations and images included in CareNotes® are the copyrighted property of [employer]  or [address] [place], [place] [id number]   The above information is an  only  It is not intended as medical advice for individual conditions or treatments  Talk to your doctor, nurse or pharmacist before following any medical regimen to see if it is safe and effective for you

## 2021-01-07 NOTE — PROGRESS NOTES
ADULT ANNUAL [facility] INTERNAL MEDICINE    NAME: Merle Henson  AGE: 63 y o  SEX: male  : 1955     DATE: 2021     Assessment and Plan:     Problem List Items Addressed This Visit        Other    Elevated BP without diagnosis of hypertension     -adhere to low sodium diet  -continue to monitor home bp with goal <130/80         Groin lump     -R inguinal area with remote h/o hernia repair  -advised avoiding activities that would increase intra-abdominal pressure  -would recommend u/s but is returning to [place] in a few days           Other Visit Diagnoses     Annual physical exam    -  Primary    Encounter for immunization        Relevant Orders    Zoster Vaccine Recombinant IM (Completed)          Immunizations and preventive care screenings were discussed with patient today  Appropriate education was printed on patient's after visit summary  Counseling:  Alcohol/drug use: discussed moderation in alcohol intake, the recommendations for healthy alcohol use, and avoidance of illicit drug use  Dental Health: discussed importance of regular tooth brushing, flossing, and dental visits  · Exercise: the importance of regular exercise/physical activity was discussed  Recommend exercise 3-5 times per week for at least 30 minutes  · Immunizations discussed  shingrix #1 due today  · Cancer screenings discussed  He is up to date  Return in about 1 year (around 2022) for Annual physical      Chief Complaint:     Chief Complaint   Patient presents with    Annual Exam      History of Present Illness:     Adult Annual Physical   Patient here for a comprehensive physical exam   He has been working in [place] most of last year  In the interim, had dx of L knee SCC skin removal      Home BP typical 130/80s  Has had BL inguinal hernia repairs, has not had sensation where he feels he has to press on R side    He has not found identified change with strenuous activity  After sexual interaction he feels R testicle move upwards and has to physically push backdown  Diet and Physical Activity  · Diet/Nutrition: well balanced diet, low carb diet  · Exercise: moderate cardiovascular exercise  Depression Screening  PHQ-9 Depression Screening    PHQ-9:   Frequency of the following problems over the past two weeks:      Little interest or pleasure in doing things: 0 - not at all  Feeling down, depressed, or hopeless: 0 - not at all  PHQ-2 Score: 0       General Health  · Sleep: sleeps well  · Hearing: decreased - right  · Vision: most recent eye exam <1 year ago  · Dental: no dental visits for >1 year   Health  · Symptoms include: nocturia     Review of Systems:     Review of Systems   Constitutional: Negative for activity change, appetite change and fatigue  HENT: Positive for hearing loss and tinnitus  Negative for congestion, mouth sores, rhinorrhea and sinus pain  Respiratory: Negative for cough, chest tightness, shortness of breath and wheezing  Cardiovascular: Negative for chest pain, palpitations and leg swelling  Gastrointestinal: Negative for abdominal pain, constipation, diarrhea, nausea and vomiting  Genitourinary: Negative for decreased urine volume and urgency  Nocturia   Musculoskeletal: Positive for arthralgias  Negative for back pain, gait problem and joint swelling  Neurological: Negative for dizziness and headaches  Psychiatric/Behavioral: Negative for decreased concentration, dysphoric mood and suicidal ideas  The patient is not nervous/anxious         Past Medical History:     Past Medical History:   Diagnosis Date    HL (hearing loss) 2018    Recently more sever    Hypertension 2010 - 2012    resolved    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    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    Squamous cell skin cancer     left forearm      Past Surgical History:     Past Surgical History:   Procedure Laterality Date    COLONOSCOPY      HERNIA REPAIR Bilateral     inguinal      Family History:     Family History   Problem Relation Age of Onset    Cancer Mother         Colon Cancer    Stroke Father         Passed away at 80    Hypertension Father     Hypertension Brother       Social History:        Social History     Socioeconomic History    Marital status: /Civil Union     Spouse name: None    Number of children: None    Years of education: None    Highest education level: None   Occupational History    Occupation: pharmacist   Social Needs    Financial resource strain: None    Food insecurity     Worry: None     Inability: None    Transportation needs     Medical: None     Non-medical: None   Tobacco Use    Smoking status: Never Smoker    Smokeless tobacco: Never Used   Substance and Sexual Activity    Alcohol use: Yes     Alcohol/week: 10 0 standard drinks     Types: 10 Cans of beer per week     Frequency: 2-3 times a week     Drinks per session: 1 or 2     Binge frequency: Never    Drug use: No    Sexual activity: Yes     Partners: Female     Birth control/protection: None     Comment:  single partner   Lifestyle    Physical activity     Days per week: None     Minutes per session: None    Stress: None   Relationships    Social connections     Talks on phone: None     Gets together: None     Attends Gnosticist service: None     Active member of club or organization: None     Attends meetings of clubs or organizations: None     Relationship status: None    Intimate partner violence     Fear of current or ex partner: None     Emotionally abused: None     Physically abused: None     Forced sexual activity: None   Other Topics Concern    None   Social History Narrative          Current Medications:     No current outpatient medications on file  No current facility-administered medications for this visit         Allergies:     No Known Allergies   Physical Exam:     /80 (BP Location: Right arm, Patient Position: Sitting)   Pulse 60   Temp 97 6 °F (36 4 °C) (Tympanic)   Ht 5' 10" (1 778 m)   Wt 78 kg (172 lb)   SpO2 96%   BMI 24 68 kg/m²     Physical Exam  Vitals signs reviewed  Constitutional:       General: He is not in acute distress  Appearance: Normal appearance  He is not ill-appearing  HENT:      Head: Normocephalic  Right Ear: Tympanic membrane, ear canal and external ear normal  There is no impacted cerumen  Left Ear: Tympanic membrane, ear canal and external ear normal  There is no impacted cerumen  Nose: Nose normal  No congestion or rhinorrhea  Mouth/Throat:      Mouth: Mucous membranes are moist       Pharynx: Oropharynx is clear  No oropharyngeal exudate or posterior oropharyngeal erythema  Eyes:      Extraocular Movements: Extraocular movements intact  Conjunctiva/sclera: Conjunctivae normal       Pupils: Pupils are equal, round, and reactive to light  Neck:      Musculoskeletal: Neck supple  No muscular tenderness  Thyroid: No thyromegaly or thyroid tenderness  Vascular: No carotid bruit  Cardiovascular:      Rate and Rhythm: Normal rate and regular rhythm  Pulses: Normal pulses  Heart sounds: Normal heart sounds  Pulmonary:      Effort: Pulmonary effort is normal  No respiratory distress  Breath sounds: Normal breath sounds  No wheezing  Abdominal:      General: Bowel sounds are normal  There is no distension  Palpations: Abdomen is soft  Tenderness: There is no abdominal tenderness  Hernia: A hernia is present  Hernia is present in the right inguinal area  There is no hernia in the left inguinal area  Musculoskeletal:      Right lower leg: No edema  Left lower leg: No edema  Comments: L 4th digit tendon hypertrophy   Lymphadenopathy:      Cervical: No cervical adenopathy  Skin:     General: Skin is warm        Coloration: Skin is not jaundiced or pale  Neurological:      General: No focal deficit present  Mental Status: He is alert and oriented to person, place, and time  Psychiatric:         Attention and Perception: Attention normal          Mood and Affect: Mood and affect normal          Speech: Speech normal          Behavior: Behavior is cooperative            Melania Carlson DO   [address] [facility] INTERNAL MEDICINE

## 2021-07-02 ENCOUNTER — CLINICAL SUPPORT (OUTPATIENT)
Dept: INTERNAL MEDICINE CLINIC | Facility: CLINIC | Age: 66
End: 2021-07-02
Payer: COMMERCIAL

## 2021-07-02 DIAGNOSIS — Z23 ENCOUNTER FOR IMMUNIZATION: Primary | ICD-10-CM

## 2021-07-02 PROCEDURE — 90750 HZV VACC RECOMBINANT IM: CPT

## 2021-07-02 PROCEDURE — 90471 IMMUNIZATION ADMIN: CPT

## 2021-07-07 ENCOUNTER — TELEPHONE (OUTPATIENT)
Dept: INTERNAL MEDICINE CLINIC | Facility: CLINIC | Age: 66
End: 2021-07-07

## 2021-07-07 DIAGNOSIS — Z11.9 ENCOUNTER FOR SCREENING FOR INFECTIOUS AND PARASITIC DISEASES, UNSPECIFIED: Primary | ICD-10-CM

## 2021-07-07 NOTE — TELEPHONE ENCOUNTER
Pts wife wants to know if you can order a coivd test without seeing him because they are traveling and if it is the PCR test?

## 2021-07-12 ENCOUNTER — TELEPHONE (OUTPATIENT)
Dept: INTERNAL MEDICINE CLINIC | Facility: CLINIC | Age: 66
End: 2021-07-12

## 2021-07-12 NOTE — TELEPHONE ENCOUNTER
----- Message from Zach Singh DO sent at 7/12/2021  7:36 AM EDT -----  Please notify of negative COVID PCR    Thanks

## 2021-07-23 ENCOUNTER — TELEPHONE (OUTPATIENT)
Dept: INTERNAL MEDICINE CLINIC | Facility: CLINIC | Age: 66
End: 2021-07-23

## 2021-07-23 DIAGNOSIS — Z11.9 ENCOUNTER FOR SCREENING FOR INFECTIOUS AND PARASITIC DISEASES, UNSPECIFIED: Primary | ICD-10-CM

## 2021-07-23 NOTE — TELEPHONE ENCOUNTER
Patient's wife is calling asking for a PCR COVID test  Patient is here for the week and needs a test to re enter [place]

## 2021-08-13 ENCOUNTER — TELEPHONE (OUTPATIENT)
Dept: INTERNAL MEDICINE CLINIC | Facility: CLINIC | Age: 66
End: 2021-08-13

## 2021-08-13 DIAGNOSIS — Z11.9 ENCOUNTER FOR SCREENING FOR INFECTIOUS AND PARASITIC DISEASES, UNSPECIFIED: Primary | ICD-10-CM

## 2021-08-13 NOTE — TELEPHONE ENCOUNTER
Pt's wife Rebeca Jarvis is calling to request a COVID-19 test for the patient  He is traveling to [facility] this Sunday and it is a requirement for international travel  Pt would appreciate a call back directly at [phone] once generated      Thank you

## 2021-09-09 ENCOUNTER — NURSE TRIAGE (OUTPATIENT)
Dept: OTHER | Facility: OTHER | Age: 66
End: 2021-09-09

## 2021-09-09 ENCOUNTER — TELEPHONE (OUTPATIENT)
Dept: INTERNAL MEDICINE CLINIC | Facility: CLINIC | Age: 66
End: 2021-09-09

## 2021-09-09 DIAGNOSIS — Z11.9 ENCOUNTER FOR SCREENING FOR INFECTIOUS AND PARASITIC DISEASES, UNSPECIFIED: Primary | ICD-10-CM

## 2021-09-09 NOTE — TELEPHONE ENCOUNTER
pts wife called  He is traveling home from [facility] and needs a PCR test   They are aware of the out of pocket expense  Call wife to let her know when the order is placed

## 2021-09-09 NOTE — TELEPHONE ENCOUNTER
This is a patient of [facility]  He was advised to call the office for COVID testing per [facility] protocol

## 2021-09-09 NOTE — TELEPHONE ENCOUNTER
Regarding: ADUQS-18 - Travel 2:2  ----- Message from Madelyn Anton sent at 9/9/2021 10:33 AM EDT -----  "They will be traveling and will need a COVID-19 test "

## 2021-09-11 ENCOUNTER — OFFICE VISIT (OUTPATIENT)
Dept: URGENT CARE | Facility: CLINIC | Age: 66
End: 2021-09-11
Payer: COMMERCIAL

## 2021-09-11 VITALS
SYSTOLIC BLOOD PRESSURE: 162 MMHG | OXYGEN SATURATION: 98 % | RESPIRATION RATE: 14 BRPM | HEART RATE: 71 BPM | TEMPERATURE: 97.2 F | BODY MASS INDEX: 24.97 KG/M2 | DIASTOLIC BLOOD PRESSURE: 98 MMHG | WEIGHT: 174 LBS

## 2021-09-11 DIAGNOSIS — S61.432A PUNCTURE WOUND OF LEFT HAND WITHOUT FOREIGN BODY, INITIAL ENCOUNTER: Primary | ICD-10-CM

## 2021-09-11 PROCEDURE — 99213 OFFICE O/P EST LOW 20 MIN: CPT | Performed by: PHYSICIAN ASSISTANT

## 2021-09-11 RX ORDER — CEPHALEXIN 500 MG/1
500 CAPSULE ORAL EVERY 6 HOURS SCHEDULED
Qty: 28 CAPSULE | Refills: 0 | Status: SHIPPED | OUTPATIENT
Start: 2021-09-11 | End: 2021-09-18

## 2021-09-11 NOTE — PATIENT INSTRUCTIONS
Puncture wound of L hand  No foreign body visualized or palpated  rx keflex 4 times per day x 7 days sent via EMR for prophylaxis  Take with food  Daily wound cleansing and if sees FB return for removal  Tetanus up to date    Follow up with PCP in 3-5 days  Proceed to  ER if symptoms worsen

## 2021-09-11 NOTE — PROGRESS NOTES
[facility]    NAME: Hadassah Nissen is a 61 y o  male  : 1955    MRN: [id number]  DATE: 2021  TIME: 9:02 AM    Assessment and Plan   Puncture wound of left hand without foreign body, initial encounter [S61 [id number]]  1  Puncture wound of left hand without foreign body, initial encounter  cephalexin (KEFLEX) 500 mg capsule     Patient Instructions   Puncture wound of L hand  No foreign body visualized or palpated  rx keflex 4 times per day x 7 days sent via EMR for prophylaxis  Take with food  Daily wound cleansing and if sees FB return for removal  Tetanus up to date    Follow up with PCP in 3-5 days  Proceed to  ER if symptoms worsen  Chief Complaint     Chief Complaint   Patient presents with    Foreign Body in Skin     pt presents with splinter in the palm area of the left hand; states he removed the splinter yesterday, not certain if he removed all of the splinter; swelling noted at the site, tender to touch         History of Present Illness       Iris Phoenix is a 62-year-old male who presents to clinic complaining of a splinter in his left hand  He states he received the injury yesterday and removed a large portion of would but he does not feel like he got all because today he woke up and his hand is significantly more painful  He denies any bleeding, discharge, or decreased range of motion of the hand  He denies any fever or chills  Review of Systems   Review of Systems   Constitutional: Negative for chills and fever  Musculoskeletal: Positive for arthralgias  Negative for joint swelling  Skin: Positive for color change (redness around wound) and wound       Current Medications       Current Outpatient Medications:     cephalexin (KEFLEX) 500 mg capsule, Take 1 capsule (500 mg total) by mouth every 6 (six) hours for 7 days, Disp: 28 capsule, Rfl: 0    Current Allergies     Allergies as of 2021    (No Known Allergies)            The following portions of the patient's history were reviewed and updated as appropriate: allergies, current medications, past family history, past medical history, past social history, past surgical history and problem list      Past Medical History:   Diagnosis Date    HL (hearing loss) 2018    Recently more sever    Hypertension 2010 - 2012    resolved    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    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    Squamous cell skin cancer     left forearm       Past Surgical History:   Procedure Laterality Date    COLONOSCOPY      HERNIA REPAIR Bilateral     inguinal       Family History   Problem Relation Age of Onset    Cancer Mother         Colon Cancer    Stroke Father         Passed away at 80    Hypertension Father     Hypertension Brother          Medications have been verified  Objective   /98   Pulse 71   Temp (!) 97 2 °F (36 2 °C)   Resp 14   Wt 78 9 kg (174 lb)   SpO2 98%   BMI 24 97 kg/m²   No LMP for male patient  Physical Exam     Physical Exam  Vitals and nursing note reviewed  Constitutional:       General: He is not in acute distress  Appearance: Normal appearance  He is not ill-appearing  Cardiovascular:      Rate and Rhythm: Normal rate and regular rhythm  Heart sounds: Normal heart sounds  Pulmonary:      Effort: Pulmonary effort is normal       Breath sounds: Normal breath sounds  Musculoskeletal:      Left hand: Laceration and tenderness present  No swelling, deformity or bony tenderness  Normal range of motion  Normal strength  Normal sensation  There is no disruption of two-point discrimination  Normal capillary refill  Hands:    Neurological:      Mental Status: He is alert and oriented to person, place, and time     Psychiatric:         Mood and Affect: Mood normal          Behavior: Behavior normal

## 2021-09-29 ENCOUNTER — TELEPHONE (OUTPATIENT)
Dept: INTERNAL MEDICINE CLINIC | Facility: CLINIC | Age: 66
End: 2021-09-29

## 2021-09-29 DIAGNOSIS — Z11.9 ENCOUNTER FOR SCREENING FOR INFECTIOUS AND PARASITIC DISEASES, UNSPECIFIED: Primary | ICD-10-CM

## 2021-10-11 ENCOUNTER — OFFICE VISIT (OUTPATIENT)
Dept: INTERNAL MEDICINE CLINIC | Facility: CLINIC | Age: 66
End: 2021-10-11
Payer: COMMERCIAL

## 2021-10-11 VITALS
TEMPERATURE: 97.8 F | HEART RATE: 65 BPM | BODY MASS INDEX: 24.62 KG/M2 | WEIGHT: 172 LBS | HEIGHT: 70 IN | DIASTOLIC BLOOD PRESSURE: 96 MMHG | SYSTOLIC BLOOD PRESSURE: 172 MMHG | OXYGEN SATURATION: 98 %

## 2021-10-11 DIAGNOSIS — Z23 ENCOUNTER FOR IMMUNIZATION: ICD-10-CM

## 2021-10-11 DIAGNOSIS — Z11.59 NEED FOR HEPATITIS C SCREENING TEST: ICD-10-CM

## 2021-10-11 DIAGNOSIS — I10 HYPERTENSION, UNSPECIFIED TYPE: Primary | ICD-10-CM

## 2021-10-11 DIAGNOSIS — Z11.4 SCREENING FOR HIV (HUMAN IMMUNODEFICIENCY VIRUS): ICD-10-CM

## 2021-10-11 DIAGNOSIS — Z13.29 SCREENING FOR THYROID DISORDER: ICD-10-CM

## 2021-10-11 DIAGNOSIS — E78.2 MIXED HYPERLIPIDEMIA: ICD-10-CM

## 2021-10-11 PROCEDURE — 90471 IMMUNIZATION ADMIN: CPT | Performed by: NURSE PRACTITIONER

## 2021-10-11 PROCEDURE — 3008F BODY MASS INDEX DOCD: CPT | Performed by: NURSE PRACTITIONER

## 2021-10-11 PROCEDURE — 99214 OFFICE O/P EST MOD 30 MIN: CPT | Performed by: NURSE PRACTITIONER

## 2021-10-11 PROCEDURE — 90682 RIV4 VACC RECOMBINANT DNA IM: CPT | Performed by: NURSE PRACTITIONER

## 2021-10-11 RX ORDER — AMLODIPINE BESYLATE 5 MG/1
5 TABLET ORAL DAILY
Qty: 30 TABLET | Refills: 0 | Status: SHIPPED | OUTPATIENT
Start: 2021-10-11 | End: 2021-11-02

## 2021-11-02 DIAGNOSIS — I10 HYPERTENSION, UNSPECIFIED TYPE: ICD-10-CM

## 2021-11-02 RX ORDER — AMLODIPINE BESYLATE 5 MG/1
TABLET ORAL
Qty: 30 TABLET | Refills: 0 | Status: SHIPPED | OUTPATIENT
Start: 2021-11-02 | End: 2021-11-12 | Stop reason: SDUPTHER

## 2021-11-12 ENCOUNTER — OFFICE VISIT (OUTPATIENT)
Dept: INTERNAL MEDICINE CLINIC | Facility: CLINIC | Age: 66
End: 2021-11-12
Payer: COMMERCIAL

## 2021-11-12 VITALS
BODY MASS INDEX: 24.2 KG/M2 | WEIGHT: 169 LBS | DIASTOLIC BLOOD PRESSURE: 92 MMHG | TEMPERATURE: 96.9 F | RESPIRATION RATE: 18 BRPM | HEART RATE: 63 BPM | OXYGEN SATURATION: 99 % | SYSTOLIC BLOOD PRESSURE: 160 MMHG | HEIGHT: 70 IN

## 2021-11-12 DIAGNOSIS — E78.2 MIXED HYPERLIPIDEMIA: Primary | ICD-10-CM

## 2021-11-12 DIAGNOSIS — I10 HYPERTENSION, UNSPECIFIED TYPE: ICD-10-CM

## 2021-11-12 PROCEDURE — 3725F SCREEN DEPRESSION PERFORMED: CPT | Performed by: NURSE PRACTITIONER

## 2021-11-12 PROCEDURE — 3080F DIAST BP >= 90 MM HG: CPT | Performed by: NURSE PRACTITIONER

## 2021-11-12 PROCEDURE — 3077F SYST BP >= 140 MM HG: CPT | Performed by: NURSE PRACTITIONER

## 2021-11-12 PROCEDURE — 99214 OFFICE O/P EST MOD 30 MIN: CPT | Performed by: NURSE PRACTITIONER

## 2021-11-12 PROCEDURE — 3008F BODY MASS INDEX DOCD: CPT | Performed by: NURSE PRACTITIONER

## 2021-11-12 RX ORDER — AMLODIPINE BESYLATE 5 MG/1
5 TABLET ORAL DAILY
Qty: 90 TABLET | Refills: 0 | Status: SHIPPED | OUTPATIENT
Start: 2021-11-12 | End: 2021-11-29 | Stop reason: SDUPTHER

## 2021-11-12 RX ORDER — ROSUVASTATIN CALCIUM 10 MG/1
10 TABLET, COATED ORAL DAILY
Qty: 30 TABLET | Refills: 5 | Status: SHIPPED | OUTPATIENT
Start: 2021-11-12 | End: 2022-04-22 | Stop reason: SDUPTHER

## 2021-11-29 DIAGNOSIS — I10 HYPERTENSION, UNSPECIFIED TYPE: ICD-10-CM

## 2021-11-30 RX ORDER — AMLODIPINE BESYLATE 5 MG/1
5 TABLET ORAL DAILY
Qty: 90 TABLET | Refills: 0 | Status: SHIPPED | OUTPATIENT
Start: 2021-11-30 | End: 2022-01-12 | Stop reason: SDUPTHER

## 2022-06-10 ENCOUNTER — HOSPITAL ENCOUNTER (OUTPATIENT)
Dept: RADIOLOGY | Facility: HOSPITAL | Age: 67
Discharge: HOME | End: 2022-06-10
Attending: ORTHOPAEDIC SURGERY
Payer: MEDICARE

## 2022-06-10 ENCOUNTER — OFFICE VISIT (OUTPATIENT)
Dept: OBGYN CLINIC | Facility: HOSPITAL | Age: 67
End: 2022-06-10
Payer: MEDICARE

## 2022-06-10 VITALS
DIASTOLIC BLOOD PRESSURE: 86 MMHG | HEIGHT: 70 IN | HEART RATE: 60 BPM | BODY MASS INDEX: 24.68 KG/M2 | WEIGHT: 172.4 LBS | SYSTOLIC BLOOD PRESSURE: 147 MMHG

## 2022-06-10 DIAGNOSIS — M72.0 DUPUYTREN'S DISEASE OF BOTH PALM AND FINGER WITHOUT CONTRACTURE: Primary | ICD-10-CM

## 2022-06-10 DIAGNOSIS — M79.5 FOREIGN BODY (FB) IN SOFT TISSUE: ICD-10-CM

## 2022-06-10 DIAGNOSIS — R52 PAIN: ICD-10-CM

## 2022-06-10 PROCEDURE — 99204 OFFICE O/P NEW MOD 45 MIN: CPT | Performed by: ORTHOPAEDIC SURGERY

## 2022-06-10 PROCEDURE — 73130 X-RAY EXAM OF HAND: CPT

## 2022-06-10 NOTE — PATIENT INSTRUCTIONS
What is Dupuytren's Disease? Dupuytren's disease is an abnormal thickening of the tissue just beneath the skin  This thickening occurs in the palm and can extend into the fingers (see Figure 1)  Firm pits, nodules, and cords may develop that can cause the fingers to bend into the palm (see Figure 2), which is a condition described as Dupuytren's contracture  Although the skin may become involved in the process, the deeper structures--such as the tendons--are not directly involved  Occasionally, the disease will cause thickening on top of the finger knuckles (knuckle pads), or lumps or cords within the soles of the feet (plantar fibromatosis)  Causes  The cause of Dupuytren's disease is unknown but may be associated with certain biochemical factors within the involved tissue  The problem is more common in men over age 36 and in people of [place] descent  There is no proven evidence that hand injuries or specific occupational exposures lead to a higher risk of developing Dupuytren's disease  Signs and Symptoms  Symptoms of Dupuytren's disease usually include lumps and pits within the palm  The lumps are generally firm and adherent to the skin  Thick cords may develop, extending from the palm into one or more fingers, with the ring and little fingers most commonly affected  These cords may be mistaken for tendons, but they actually lie between the skin and the tendons  These cords cause bending or contractures of the fingers  In many cases, both hands are affected, although the degree of involvement may vary  The initial lumps may produce discomfort that usually resolves, but Dupuytren's disease is not typically painful  The disease may first be noticed because of difficulty placing the hand flat on an even surface, such as a tabletop (see Figure 3)   As the tissue thickens and fingers are drawn into the palm, one may notice increasing difficulty with activities such as washing, wearing gloves, shaking hands, and putting hands into pockets  Progression is unpredictable  Some individuals will have only small lumps or cords while others will develop severely bent fingers  More severe disease often occurs with an earlier age of onset  Treatment  In mild cases, especially if hand function is not affected, only observation is needed  For more severe cases, various treatment options are available in order to straighten the finger(s)  These options may include needles or open surgery  Your treating surgeon will discuss the method most appropriate for your condition based upon the stage and pattern of the disease and the joints involved  The goal of treatment is to improve finger position and thereby hand function  Despite treatment, the disease process may recur  Before treatment, your treating surgeon will discuss realistic goals, possible risks and results  Specific surgical considerations include the following: The presence of a lump in the palm does not mean that surgery is required or that the disease will progress  Correction of finger position is best accomplished with milder contractures or contractures that affect the base of the finger  Complete correction sometimes cannot be attained, especially of the middle and end joints in the finger  Skin grafts are sometimes required to cover open areas in the fingers if the skin is deficient  The nerves that provide feeling to the fingertips are often intertwined with the cords  Splinting and hand therapy are often required after surgery in order to maximize and maintain the improvement in finger position and function  © 2012 American Society for Surgery of the Hand  www handcare  org

## 2022-06-10 NOTE — PROGRESS NOTES
ASSESSMENT/PLAN:    Assessment:   Dupuytrens Disease of the  bilateral  Hand  Left hand foreign body     Plan:   Due to the patient not having any contractures at this time, we recommend performing monthly table top tests to monitor for progression  We did discuss Xiaflex vs surgery if he progresses to having contractures  He expressed understanding  Also discussed weight lifting and biking gloves  We will order an US of the left thumb thenar space to evaluate for foreign body  Follow Up: After Testing    To Do Next Visit:       General Discussions:     Dupuytren's Disease: The anatomy and physiology of Dupuytren's disease were discussed with the patient today in the office  Increased scar formation within the interval between the skin volarly and the flexor tendons dorsally can result in pit formation, nodular formation, or eventual cord formation  These pathologic cords can cause contracture at either the metacarpophalangeal joint, proximal interphalangeal joint, or both  As the cords progress towards the proximal interphalangeal joint, the neurovascular structures of the finger may become involved within the disease process  While this is a genetic condition, variable penetrance occurs within family trees  Conservative treatment options including therapy to maintain joint mobility and a tabletop test were discussed  Other treatments include Xiaflex and possible surgical intervention  Operative Discussions:       _____________________________________________________  CHIEF COMPLAINT:  Chief Complaint   Patient presents with    Left Hand - Pain     H/O puncture wound 9/11/21    Right Hand - Pain         SUBJECTIVE:  Tiff Burnett is a 74 y o  male who presents with masses to the bilateral hand  This started  2 year(s) ago as Chronic  Patient states that a wooden splinter was in his left hand on 9/11/2021 that he was seen in urgent care for removal, he is unsure of the success   He states he has pain while cycling when he rests his hands on his handlebars  Patient is a referral from his PCP  Radiation: Yes to the  hand  Previous Treatments: activity modification without relief  Associated symptoms: No Complaints    PAST MEDICAL HISTORY:  Past Medical History:   Diagnosis Date    HL (hearing loss) 2018    Recently more sever    Hypertension 2010 - 2012    resolved    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    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    Squamous cell skin cancer     left forearm       PAST SURGICAL HISTORY:  Past Surgical History:   Procedure Laterality Date    COLONOSCOPY      EYE SURGERY Bilateral 2019    prebyopia correction    HERNIA REPAIR Bilateral     inguinal       FAMILY HISTORY:  Family History   Problem Relation Age of Onset    Cancer Mother         Colon Cancer    Stroke Father         Passed away at 80    Hypertension Father     Hypertension Brother        SOCIAL HISTORY:  Social History     Tobacco Use    Smoking status: Never Smoker    Smokeless tobacco: Never Used   Vaping Use    Vaping Use: Never used   Substance Use Topics    Alcohol use: Yes     Alcohol/week: 10 0 standard drinks     Types: 10 Cans of beer per week    Drug use: No       MEDICATIONS:    Current Outpatient Medications:     amLODIPine (NORVASC) 5 mg tablet, Take 1 tab in AM, may take additional 1 tab if BP >140/90, Disp: 180 tablet, Rfl: 1    rosuvastatin (CRESTOR) 10 MG tablet, Take 1 tablet (10 mg total) by mouth daily, Disp: 90 tablet, Rfl: 1    fluorouracil (EFUDEX) 5 % cream, , Disp: , Rfl:     ALLERGIES:  No Known Allergies    REVIEW OF SYSTEMS:  Pertinent items are noted in HPI      LABS:  HgA1c: No results found for: HGBA1C  BMP: No results found for: GLUCOSE, CALCIUM, NA, K, CO2, CL, BUN, CREATININE      _____________________________________________________  PHYSICAL EXAMINATION:  Vital signs: /86   Pulse 60   Ht 5' 10" (1 778 m)   Wt 78 2 kg (172 lb 6 4 oz)   BMI 24 74 kg/m²   General: well developed and well nourished, alert, oriented times 3 and appears comfortable  Psychiatric: Normal  HEENT: Trachea Midline, No torticollis  Cardiovascular: No discernable arrhythmia  Pulmonary: No wheezing or stridor  Abdomen: No rebound or guarding  Extremities: No peripheral edema  Skin: No Erythema  Neurovascular: Sensation Intact to the Median, Ulnar, Radial Nerve, Motor Intact to the Median, Ulnar, Radial Nerve and Pulses Intact    MUSCULOSKELETAL EXAMINATION:  LEFT SIDE:  Pre tenderness cord to left hand ring finger and adductor space of left thumb, small pit of ring finger just distal to the distal iverson flexion crease, no joint contracture, no knucklepads, ttp thumb thenar space at site of possible foreign body   RIGHT SIDE:  Precentral cord to ring finger with a nodule just distal to the distal palmar flexion crease without contracture    _____________________________________________________  STUDIES REVIEWED:  Images were reviewed in PACS by Dr Antony Cano and demonstrate: xray of left hand on 6/10/2022 demonstrates no evidence of foreign body         PROCEDURES PERFORMED:  Procedures  No Procedures performed today   Scribe Attestation    I,:  Brianna Graves am acting as a scribe while in the presence of the attending physician :       I,:  Bonita Bolaños MD personally performed the services described in this documentation    as scribed in my presence :

## 2022-06-15 ENCOUNTER — TELEPHONE (OUTPATIENT)
Dept: OBGYN CLINIC | Facility: HOSPITAL | Age: 67
End: 2022-06-15

## 2022-06-15 NOTE — TELEPHONE ENCOUNTER
Spoke to patient's wife, patient r/s'd US so follow up needed to be adjusted  We were  Unable to agree on a date due to the patient's travel schedule  Advised patient to contact office a few days after [address] is completed for results and we will go from there   Patient's wife agreed

## 2022-06-27 ENCOUNTER — HOSPITAL ENCOUNTER (OUTPATIENT)
Dept: RADIOLOGY | Facility: HOSPITAL | Age: 67
Discharge: HOME | End: 2022-06-27
Attending: ORTHOPAEDIC SURGERY
Payer: MEDICARE

## 2022-06-27 DIAGNOSIS — S60.352A: ICD-10-CM

## 2022-06-27 PROCEDURE — 76882 US LMTD JT/FCL EVL NVASC XTR: CPT

## 2022-07-05 ENCOUNTER — PATIENT MESSAGE (OUTPATIENT)
Dept: OBGYN CLINIC | Facility: HOSPITAL | Age: 67
End: 2022-07-05

## 2022-07-15 ENCOUNTER — OFFICE VISIT (OUTPATIENT)
Dept: INTERNAL MEDICINE CLINIC | Facility: CLINIC | Age: 67
End: 2022-07-15
Payer: MEDICARE

## 2022-07-15 ENCOUNTER — OFFICE VISIT (OUTPATIENT)
Dept: OBGYN CLINIC | Facility: HOSPITAL | Age: 67
End: 2022-07-15
Payer: MEDICARE

## 2022-07-15 VITALS
BODY MASS INDEX: 25.34 KG/M2 | HEIGHT: 70 IN | HEART RATE: 57 BPM | WEIGHT: 177 LBS | DIASTOLIC BLOOD PRESSURE: 81 MMHG | SYSTOLIC BLOOD PRESSURE: 135 MMHG

## 2022-07-15 VITALS
TEMPERATURE: 96.5 F | HEIGHT: 70 IN | DIASTOLIC BLOOD PRESSURE: 70 MMHG | OXYGEN SATURATION: 96 % | SYSTOLIC BLOOD PRESSURE: 142 MMHG | WEIGHT: 170 LBS | HEART RATE: 56 BPM | BODY MASS INDEX: 24.34 KG/M2

## 2022-07-15 DIAGNOSIS — Z23 ENCOUNTER FOR IMMUNIZATION: ICD-10-CM

## 2022-07-15 DIAGNOSIS — E78.2 MIXED HYPERLIPIDEMIA: ICD-10-CM

## 2022-07-15 DIAGNOSIS — M79.5 FOREIGN BODY (FB) IN SOFT TISSUE: Primary | ICD-10-CM

## 2022-07-15 DIAGNOSIS — Z12.5 SCREENING FOR PROSTATE CANCER: ICD-10-CM

## 2022-07-15 DIAGNOSIS — Z00.00 WELCOME TO MEDICARE PREVENTIVE VISIT: Primary | ICD-10-CM

## 2022-07-15 DIAGNOSIS — I10 PRIMARY HYPERTENSION: ICD-10-CM

## 2022-07-15 PROCEDURE — 90677 PCV20 VACCINE IM: CPT | Performed by: INTERNAL MEDICINE

## 2022-07-15 PROCEDURE — 99213 OFFICE O/P EST LOW 20 MIN: CPT | Performed by: PHYSICIAN ASSISTANT

## 2022-07-15 PROCEDURE — G0402 INITIAL PREVENTIVE EXAM: HCPCS | Performed by: INTERNAL MEDICINE

## 2022-07-15 PROCEDURE — G0009 ADMIN PNEUMOCOCCAL VACCINE: HCPCS | Performed by: INTERNAL MEDICINE

## 2022-07-15 PROCEDURE — 1123F ACP DISCUSS/DSCN MKR DOCD: CPT | Performed by: INTERNAL MEDICINE

## 2022-07-15 PROCEDURE — G0403 EKG FOR INITIAL PREVENT EXAM: HCPCS | Performed by: INTERNAL MEDICINE

## 2022-07-15 NOTE — PROGRESS NOTES
Assessment and Plan:     Problem List Items Addressed This Visit        Cardiovascular and Mediastinum    Hypertension    Relevant Orders    Comprehensive metabolic panel    Comprehensive metabolic panel    TSH, 3rd generation with Free T4 reflex    UA (URINE) with reflex to Scope    CBC       Other    Mixed hyperlipidemia    Relevant Orders    Lipid panel    Comprehensive metabolic panel    Lipid panel    Comprehensive metabolic panel      Other Visit Diagnoses     Welcome to Medicare preventive visit    -  Primary    Relevant Orders    POCT ECG (Completed)    Screening for prostate cancer        Relevant Orders    PSA, Total Screen    Encounter for immunization        Relevant Orders    Pneumococcal Conjugate Vaccine 20-valent (Pcv20) (Completed)           Preventive health issues were discussed with patient, and age appropriate screening tests were ordered as noted in patient's After Visit Summary  Personalized health advice and appropriate referrals for health education or preventive services given if needed, as noted in patient's After Visit Summary  History of Present Illness:     Patient presents for a Medicare Wellness Visit    Home -130/70-80s, pulse 50s    HPI   Patient Care Team:  Katia Monaco DO as PCP - Kalen Jc MD (Orthopedic Surgery)  Shawn Reyes MD (Gastroenterology)     Review of Systems:     Review of Systems   Constitutional: Positive for activity change and appetite change  Negative for fatigue and unexpected weight change  HENT: Negative for congestion, postnasal drip and rhinorrhea  Respiratory: Negative for cough, chest tightness, shortness of breath and wheezing  Cardiovascular: Negative for chest pain, palpitations and leg swelling  Gastrointestinal: Negative for abdominal pain, constipation and diarrhea  Genitourinary: Positive for decreased urine volume          Nocturia x1   Musculoskeletal: Negative for joint swelling  Neurological: Negative for dizziness, seizures and headaches  Psychiatric/Behavioral: Negative for dysphoric mood and sleep disturbance  The patient is not nervous/anxious  Problem List:     Patient Active Problem List   Diagnosis    Vertigo    Sinus bradycardia    Mixed hyperlipidemia    Tenosynovitis of hand    Family history of colon cancer    Right asymmetrical SNHL    Hypocalcemia    Elevated BP without diagnosis of hypertension    Groin lump    Hypertension      Past Medical and Surgical History:     Past Medical History:   Diagnosis Date    HL (hearing loss) 2018    Recently more sever    Hypertension 2010 - 2012    resolved    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    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    Squamous cell skin cancer     left forearm     Past Surgical History:   Procedure Laterality Date    COLONOSCOPY      EYE SURGERY Bilateral 2019    prebyopia correction    HERNIA REPAIR Bilateral     inguinal      Family History:     Family History   Problem Relation Age of Onset    Cancer Mother         Colon Cancer    Stroke Father         Passed away at 80    Hypertension Father     Hypertension Brother       Social History:     Social History     Socioeconomic History    Marital status: /Civil Union     Spouse name: None    Number of children: None    Years of education: None    Highest education level: None   Occupational History    Occupation: pharmacist   Tobacco Use    Smoking status: Never Smoker    Smokeless tobacco: Never Used   Vaping Use    Vaping Use: Never used   Substance and Sexual Activity    Alcohol use:  Yes     Alcohol/week: 10 0 standard drinks     Types: 10 Cans of beer per week    Drug use: No    Sexual activity: Yes     Partners: Female     Birth control/protection: None     Comment:  single partner   Other Topics Concern    None   Social History Narrative         Social Determinants of Health     Financial Resource Strain: Not on file   Food Insecurity: Not on file   Transportation Needs: Not on file   Physical Activity: Not on file   Stress: Not on file   Social Connections: Not on file   Intimate Partner Violence: Not on file   Housing Stability: Not on file      Medications and Allergies:     Current Outpatient Medications   Medication Sig Dispense Refill    amLODIPine (NORVASC) 5 mg tablet Take 1 tab in AM, may take additional 1 tab if BP >140/90 180 tablet 1    rosuvastatin (CRESTOR) 10 MG tablet Take 1 tablet (10 mg total) by mouth daily 90 tablet 1    fluorouracil (EFUDEX) 5 % cream  (Patient not taking: Reported on 7/15/2022)       No current facility-administered medications for this visit  No Known Allergies   Immunizations:     Immunization History   Administered Date(s) Administered    COVID-19 PFIZER VACCINE 0 3 ML IM 04/23/2021, 06/19/2021, 12/16/2021    INFLUENZA 09/12/2018    Influenza, recombinant, quadrivalent,injectable, preservative free 10/11/2021    Influenza, seasonal, injectable 10/10/2012, 09/01/2016, 09/01/2017    MMR 10/31/2012    Pneumococcal Conjugate Vaccine 20-valent (Pcv20), Polysace 07/15/2022    Tdap 10/31/2012, 01/01/2013    Tuberculin Skin Test 10/29/2012, 10/29/2012    Tuberculin Skin Test-PPD Intradermal 10/29/2012    Typhoid Live, oral 06/11/2018    Zoster Vaccine Recombinant 01/07/2021, 07/02/2021      Health Maintenance:         Topic Date Due    Colorectal Cancer Screening  12/05/2022    HIV Screening  Completed    Hepatitis C Screening  Completed         Topic Date Due    COVID-19 Vaccine (4 - Booster for [place] series) 04/16/2022    Influenza Vaccine (1) 09/01/2022      Medicare Screening Tests and Risk Assessments:     Sina Luther is here for his Welcome to Medicare visit  Health Risk Assessment:   Patient rates overall health as excellent  Patient feels that their physical health rating is same   Patient is satisfied with their life  Madden Decant was rated as same  Hearing was rated as same  Patient feels that their emotional and mental health rating is same  Patients states they are never, rarely angry  Patient states they are never, rarely unusually tired/fatigued  Pain experienced in the last 7 days has been none  Patient states that he has experienced no weight loss or gain in last 6 months  Depression Screening:   PHQ-2 Score: 0      Fall Risk Screening: In the past year, patient has experienced: no history of falling in past year      Home Safety:  Patient does not have trouble with stairs inside or outside of their home  Patient has working smoke alarms and has working carbon monoxide detector  Home safety hazards include: none  Nutrition:   Current diet is Low Carb and No Added Salt  Medications:   Patient is not currently taking any over-the-counter supplements  Patient is able to manage medications  Activities of Daily Living (ADLs)/Instrumental Activities of Daily Living (IADLs):   Walk and transfer into and out of bed and chair?: Yes  Dress and groom yourself?: Yes    Bathe or shower yourself?: Yes    Feed yourself? Yes  Do your laundry/housekeeping?: Yes  Manage your money, pay your bills and track your expenses?: Yes  Make your own meals?: Yes    Do your own shopping?: Yes    Durable Medical Equipment Suppliers  pt does not know    Previous Hospitalizations:   Any hospitalizations or ED visits within the last 12 months?: No      Advance Care Planning:   Living will: Yes    Durable POA for healthcare: Yes    Advanced directive: Yes    Advanced directive counseling given: No      Comments: Patient to provide copy of Advanced Directives      Cognitive Screening:   Provider or family/friend/caregiver concerned regarding cognition?: No    PREVENTIVE SCREENINGS      Cardiovascular Screening:    General: Screening Not Indicated, History Lipid Disorder and Screening Current      Diabetes Screening:     General: Screening Current      Colorectal Cancer Screening:     General: Screening Current      Prostate Cancer Screening:    General: Screening Current      Osteoporosis Screening:    General: Risks and Benefits Discussed and Patient Declines      Abdominal Aortic Aneurysm (AAA) Screening:    Risk factors include: age between 73-69 yo        General: Screening Not Indicated      Lung Cancer Screening:     General: Screening Not Indicated      Hepatitis C Screening:    General: Screening Current    Screening, Brief Intervention, and Referral to Treatment (SBIRT)    Screening    Typical number of drinks in a week: 10    AUDIT-C Screenin) How often did you have a drink containing alcohol in the past year? 2 to 3 times a week  2) How many drinks did you have on a typical day when you were drinking in the past year? 3 to 4  3) How often did you have 6 or more drinks on one occasion in the past year? never    AUDIT-C Score: 3  Interpretation: Score 0-3 (male): Negative screen for alcohol misuse    Single Item Drug Screening:  How often have you used an illegal drug (including marijuana) or a prescription medication for non-medical reasons in the past year? never    Single Item Drug Screen Score: 0  Interpretation: Negative screen for possible drug use disorder    Brief Intervention  Alcohol & drug use screenings were reviewed  No concerns regarding substance use disorder identified  Healthy alcohol use/limits discussed  Time Spent  Time spent providing alcohol/substance abuse assessment and intervention services: 2 minutes    Other Counseling Topics:   Car/seat belt/driving safety, skin self-exam, sunscreen and regular weightbearing exercise and calcium and vitamin D intake  Immunizations discussd  Prevnar 20 due today  Recommend yearly influenza vaccine       Visual Acuity Screening    Right eye Left eye Both eyes   Without correction: 20/15 20/40 20/20   With correction:           Physical Exam:     /70   Pulse 56 Temp (!) 96 5 °F (35 8 °C)   Ht 5' 10" (1 778 m)   Wt 77 1 kg (170 lb)   SpO2 96%   BMI 24 39 kg/m²     Physical Exam  Vitals reviewed  Constitutional:       General: He is not in acute distress  Appearance: He is normal weight  He is not diaphoretic  HENT:      Head: Normocephalic  Right Ear: Tympanic membrane and ear canal normal  There is no impacted cerumen  Left Ear: Tympanic membrane and ear canal normal  There is no impacted cerumen  Nose: Nose normal  No congestion or rhinorrhea  Mouth/Throat:      Mouth: Mucous membranes are moist       Pharynx: Oropharynx is clear  No oropharyngeal exudate or posterior oropharyngeal erythema  Eyes:      General:         Right eye: No discharge  Left eye: No discharge  Extraocular Movements: Extraocular movements intact  Conjunctiva/sclera: Conjunctivae normal       Pupils: Pupils are equal, round, and reactive to light  Neck:      Thyroid: No thyromegaly or thyroid tenderness  Vascular: No carotid bruit  Cardiovascular:      Rate and Rhythm: Normal rate and regular rhythm  Pulses: Normal pulses  Heart sounds: Normal heart sounds  Pulmonary:      Effort: Pulmonary effort is normal  No respiratory distress  Breath sounds: Normal breath sounds  No wheezing  Abdominal:      General: Abdomen is flat  Bowel sounds are normal  There is no distension  Palpations: Abdomen is soft  Tenderness: There is no abdominal tenderness  Musculoskeletal:         General: Normal range of motion  Cervical back: Neck supple  No tenderness  Right lower leg: No edema  Left lower leg: No edema  Lymphadenopathy:      Cervical: No cervical adenopathy  Skin:     General: Skin is dry  Coloration: Skin is not pale  Neurological:      General: No focal deficit present  Mental Status: He is alert and oriented to person, place, and time     Psychiatric:         Attention and Perception: Attention normal          Mood and Affect: Mood normal          Speech: Speech normal          Behavior: Behavior is cooperative  EKG: sinus bradycardia at 49bpm, normal axis, incomplete RBBB  HNL labs from 1/14/22 were reviewed and discussed with the patient      Boone FaustinMary Starke [facility]

## 2022-07-15 NOTE — PROGRESS NOTES
ASSESSMENT/PLAN:    Assessment:   Foreign Object  left  Thumb in the thenar muscle    Plan:   Foreign Object excision  left  Volar  Thumb in the thenar musculature    Follow Up: After Surgery    To Do Next Visit:  Sutures out    Operative Discussions:  Standard Consent: The risks and benefits of the procedure were explained to the patient, which include, but are not limited to: Bleeding, infection, recurrence, pain, scar, damage to tendons, damage to nerves, and damage to blood vessels, failure to give desired results and complications related to anesthesia  These risks, along with alternative conservative treatment options, and postoperative protocols were voiced back and understood by the patient  All questions were answered to the patient's satisfaction  The patient agrees to comply with a standard postoperative protocol, and is willing to proceed  Education was provided via written and auditory forms  There were no barriers to learning  Written handouts regarding wound care, incision and scar care, and general preoperative information was provided to the patient  Prior to surgery, the patient may be requested to stop all anti-inflammatory medications  Prophylactic aspirin, Plavix, and Coumadin may be allowed to be continued  Medications including vitamin E , ginkgo, and fish oil are requested to be stopped approximately one week prior to surgery  Hypertensive medications and beta blockers, if taken, should be continued  _____________________________________________________  CHIEF COMPLAINT:  Chief Complaint   Patient presents with    Left Hand - Hand Injury         SUBJECTIVE:  Ysabel Wilson is a 74 y o  male who presents for follow up regarding foreign body in the left hand from 9/11/2021  Patient states that it is bothersome when gripping handlebars and when trying to push up on his hand  He denies any other issues  He did have an US to confirm FB location  He denies any other acute complaints  PAST MEDICAL HISTORY:  Past Medical History:   Diagnosis Date    HL (hearing loss) 2018    Recently more sever    Hypertension 2010 - 2012    resolved    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    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    Squamous cell skin cancer     left forearm       PAST SURGICAL HISTORY:  Past Surgical History:   Procedure Laterality Date    COLONOSCOPY      EYE SURGERY Bilateral 2019    prebyopia correction    HERNIA REPAIR Bilateral     inguinal       FAMILY HISTORY:  Family History   Problem Relation Age of Onset    Cancer Mother         Colon Cancer    Stroke Father         Passed away at 80    Hypertension Father     Hypertension Brother        SOCIAL HISTORY:  Social History     Tobacco Use    Smoking status: Never Smoker    Smokeless tobacco: Never Used   Vaping Use    Vaping Use: Never used   Substance Use Topics    Alcohol use: Yes     Alcohol/week: 10 0 standard drinks     Types: 10 Cans of beer per week    Drug use: No       MEDICATIONS:    Current Outpatient Medications:     amLODIPine (NORVASC) 5 mg tablet, Take 1 tab in AM, may take additional 1 tab if BP >140/90, Disp: 180 tablet, Rfl: 1    fluorouracil (EFUDEX) 5 % cream, , Disp: , Rfl:     rosuvastatin (CRESTOR) 10 MG tablet, Take 1 tablet (10 mg total) by mouth daily, Disp: 90 tablet, Rfl: 1    ALLERGIES:  No Known Allergies    REVIEW OF SYSTEMS:  Pertinent items are noted in HPI  A comprehensive review of systems was negative      LABS:  HgA1c: No results found for: HGBA1C  BMP: No results found for: GLUCOSE, CALCIUM, NA, K, CO2, CL, BUN, CREATININE        _____________________________________________________  PHYSICAL EXAMINATION:  Vital signs: /81   Pulse 57   Ht 5' 10" (1 778 m)   Wt 80 3 kg (177 lb)   BMI 25 40 kg/m²   General: well developed and well nourished, alert, oriented times 3 and appears comfortable  Psychiatric: Normal  HEENT: Trachea Midline, No torticollis  Cardiovascular: No discernable arrhythmia  Pulmonary: No wheezing or stridor  Abdomen: No rebound or guarding  Extremities: No peripheral edema  Skin: No masses, erythema, lacerations, fluctation, ulcerations  Neurovascular: Sensation Intact to the Median, Ulnar, Radial Nerve, Motor Intact to the Median, Ulnar, Radial Nerve and Pulses Intact    MUSCULOSKELETAL EXAMINATION:  Left thumb:  Tenderness over the thenar muscle  No discernable FB palpable on exam today  No wound or erythema in the thenar muscle  Full ROM of thumb  Sensation intact    _____________________________________________________  STUDIES REVIEWED:  Images were reviewed in PACS by Dr Maik Pleitez and demonstrate: foreign body in the left thenar muscle      PROCEDURES PERFORMED:  Procedures  No Procedures performed today

## 2022-07-15 NOTE — PATIENT INSTRUCTIONS
Medicare Preventive Visit Patient Instructions  Thank you for completing your Welcome to Medicare Visit or Medicare Annual Wellness Visit today  Your next wellness visit will be due in one year (7/16/2023)  The screening/preventive services that you may require over the next 5-10 years are detailed below  Some tests may not apply to you based off risk factors and/or age  Screening tests ordered at today's visit but not completed yet may show as past due  Also, please note that scanned in results may not display below  Preventive Screenings:  Service Recommendations Previous Testing/Comments   Colorectal Cancer Screening  · Colonoscopy    · Fecal Occult Blood Test (FOBT)/Fecal Immunochemical Test (FIT)  · Fecal DNA/Cologuard Test  · Flexible Sigmoidoscopy Age: 54-65 years old   Colonoscopy: every 10 years (May be performed more frequently if at higher risk)  OR  FOBT/FIT: every 1 year  OR  Cologuard: every 3 years  OR  Sigmoidoscopy: every 5 years  Screening may be recommended earlier than age 48 if at higher risk for colorectal cancer  Also, an individualized decision between you and your healthcare provider will decide whether screening between the ages of 74-80 would be appropriate   Colonoscopy: 12/05/2019  FOBT/FIT: Not on file  Cologuard: Not on file  Sigmoidoscopy: Not on file    Screening Current     Prostate Cancer Screening Individualized decision between patient and health care provider in men between ages of 53-78   Medicare will cover every 12 months beginning on the day after your 50th birthday PSA: No results in last 5 years           Hepatitis C Screening Once for adults born between 1945 and 1965  More frequently in patients at high risk for Hepatitis C Hep C Antibody: 10/11/2021    Screening Current   Diabetes Screening 1-2 times per year if you're at risk for diabetes or have pre-diabetes Fasting glucose: No results in last 5 years   A1C: No results in last 5 years        Cholesterol Screening Once every 5 years if you don't have a lipid disorder  May order more often based on risk factors  Lipid panel: Not on file    Screening Not Indicated  History Lipid Disorder      Other Preventive Screenings Covered by Medicare:  1  Abdominal Aortic Aneurysm (AAA) Screening: covered once if your at risk  You're considered to be at risk if you have a family history of AAA or a male between the age of 73-68 who smoking at least 100 cigarettes in your lifetime  2  Lung Cancer Screening: covers low dose CT scan once per year if you meet all of the following conditions: (1) Age 50-69; (2) No signs or symptoms of lung cancer; (3) Current smoker or have quit smoking within the last 15 years; (4) You have a tobacco smoking history of at least 30 pack years (packs per day x number of years you smoked); (5) You get a written order from a healthcare provider  3  Glaucoma Screening: covered annually if you're considered high risk: (1) You have diabetes OR (2) Family history of glaucoma OR (3)  aged 48 and older OR (3)  American aged 72 and older  3  Osteoporosis Screening: covered every 2 years if you meet one of the following conditions: (1) Have a vertebral abnormality; (2) On glucocorticoid therapy for more than 3 months; (3) Have primary hyperparathyroidism; (4) On osteoporosis medications and need to assess response to drug therapy  5  HIV Screening: covered annually if you're between the age of 12-76  Also covered annually if you are younger than 13 and older than 72 with risk factors for HIV infection  For pregnant patients, it is covered up to 3 times per pregnancy      Immunizations:  Immunization Recommendations   Influenza Vaccine Annual influenza vaccination during flu season is recommended for all persons aged >= 6 months who do not have contraindications   Pneumococcal Vaccine (Prevnar and Pneumovax)  * Prevnar = PCV13  * Pneumovax = PPSV23 Adults 25-60 years old: 1-3 doses may be recommended based on certain risk factors  Adults 72 years old: Prevnar (PCV13) vaccine recommended followed by Pneumovax (PPSV23) vaccine  If already received PPSV23 since turning 65, then PCV13 recommended at least one year after PPSV23 dose  Hepatitis B Vaccine 3 dose series if at intermediate or high risk (ex: diabetes, end stage renal disease, liver disease)   Tetanus (Td) Vaccine - COST NOT COVERED BY MEDICARE PART B Following completion of primary series, a booster dose should be given every 10 years to maintain immunity against tetanus  Td may also be given as tetanus wound prophylaxis  Tdap Vaccine - COST NOT COVERED BY MEDICARE PART B Recommended at least once for all adults  For pregnant patients, recommended with each pregnancy  Shingles Vaccine (Shingrix) - COST NOT COVERED BY MEDICARE PART B  2 shot series recommended in those aged 48 and above     Health Maintenance Due:      Topic Date Due    Colorectal Cancer Screening  12/05/2022    HIV Screening  Completed    Hepatitis C Screening  Completed     Immunizations Due:      Topic Date Due    COVID-19 Vaccine (4 - Booster for Pfizer series) 04/16/2022    Pneumococcal Vaccine: 65+ Years (1 - PCV) Never done    Influenza Vaccine (1) 09/01/2022     Advance Directives   What are advance directives? Advance directives are legal documents that state your wishes and plans for medical care  These plans are made ahead of time in case you lose your ability to make decisions for yourself  Advance directives can apply to any medical decision, such as the treatments you want, and if you want to donate organs  What are the types of advance directives? There are many types of advance directives, and each state has rules about how to use them  You may choose a combination of any of the following:  · Living will: This is a written record of the treatment you want   You can also choose which treatments you do not want, which to limit, and which to stop at a certain time  This includes surgery, medicine, IV fluid, and tube feedings  · Durable power of  for healthcare [facility]): This is a written record that states who you want to make healthcare choices for you when you are unable to make them for yourself  This person, called a proxy, is usually a family member or a friend  You may choose more than 1 proxy  · Do not resuscitate (DNR) order:  A DNR order is used in case your heart stops beating or you stop breathing  It is a request not to have certain forms of treatment, such as CPR  A DNR order may be included in other types of advance directives  · Medical directive: This covers the care that you want if you are in a coma, near death, or unable to make decisions for yourself  You can list the treatments you want for each condition  Treatment may include pain medicine, surgery, blood transfusions, dialysis, IV or tube feedings, and a ventilator (breathing machine)  · Values history: This document has questions about your views, beliefs, and how you feel and think about life  This information can help others choose the care that you would choose  Why are advance directives important? An advance directive helps you control your care  Although spoken wishes may be used, it is better to have your wishes written down  Spoken wishes can be misunderstood, or not followed  Treatments may be given even if you do not want them  An advance directive may make it easier for your family to make difficult choices about your care  © Copyright [employer] 2018 Information is for End User's use only and may not be sold, redistributed or otherwise used for commercial purposes   All illustrations and images included in CareNotes® are the copyrighted property of A D A M , Inc  or [facility]

## 2022-07-18 RX ORDER — CEFAZOLIN SODIUM 2 G/50ML
2000 SOLUTION INTRAVENOUS ONCE
Status: CANCELLED | OUTPATIENT
Start: 2022-09-15 | End: 2022-07-18

## 2022-07-29 LAB
ALBUMIN SERPL-MCNC: 4.2 G/DL (ref 3.6–5.1)
ALBUMIN/GLOB SERPL: 1.7 (CALC) (ref 1–2.5)
ALP SERPL-CCNC: 47 U/L (ref 35–144)
ALT SERPL-CCNC: 19 U/L (ref 9–46)
AST SERPL-CCNC: 23 U/L (ref 10–35)
BILIRUB SERPL-MCNC: 1.9 MG/DL (ref 0.2–1.2)
BUN SERPL-MCNC: 18 MG/DL (ref 7–25)
BUN/CREAT SERPL: ABNORMAL (CALC) (ref 6–22)
CALCIUM SERPL-MCNC: 9.4 MG/DL (ref 8.6–10.3)
CHLORIDE SERPL-SCNC: 102 MMOL/L (ref 98–110)
CHOLEST SERPL-MCNC: 191 MG/DL
CHOLEST/HDLC SERPL: 2.2 (CALC)
CO2 SERPL-SCNC: 31 MMOL/L (ref 20–32)
CREAT SERPL-MCNC: 1.11 MG/DL (ref 0.7–1.35)
GFR/BSA.PRED SERPLBLD CYS-BASED-ARV: 74 ML/MIN/1.73M2
GLOBULIN SER CALC-MCNC: 2.5 G/DL (CALC) (ref 1.9–3.7)
GLUCOSE SERPL-MCNC: 88 MG/DL (ref 65–99)
HDLC SERPL-MCNC: 85 MG/DL
LDLC SERPL CALC-MCNC: 92 MG/DL (CALC)
NONHDLC SERPL-MCNC: 106 MG/DL (CALC)
POTASSIUM SERPL-SCNC: 4.7 MMOL/L (ref 3.5–5.3)
PROT SERPL-MCNC: 6.7 G/DL (ref 6.1–8.1)
SODIUM SERPL-SCNC: 139 MMOL/L (ref 135–146)
TRIGL SERPL-MCNC: 58 MG/DL

## 2022-08-08 DIAGNOSIS — I10 HYPERTENSION, UNSPECIFIED TYPE: ICD-10-CM

## 2022-08-08 RX ORDER — AMLODIPINE BESYLATE 5 MG/1
TABLET ORAL
Qty: 180 TABLET | Refills: 1 | Status: SHIPPED | OUTPATIENT
Start: 2022-08-08

## 2022-09-13 NOTE — PRE-PROCEDURE INSTRUCTIONS
Pre-Surgery Instructions:   Medication Instructions    amLODIPine (NORVASC) 5 mg tablet Take per normal schedule     rosuvastatin (CRESTOR) 10 MG tablet Take per normal schedule     Pre op,medications and showering instructions reviewed-Patient has hibiclensInstructed to avoid all ASA/NSAIDs and OTC Vit/Supp from now until after surgery per anesthesia guidelines  Tylenol ok prnPt  Verbalized an understanding of all instructions reviewed and offers no concerns at this time

## 2022-09-15 ENCOUNTER — HOSPITAL ENCOUNTER (OUTPATIENT)
Facility: HOSPITAL | Age: 67
Setting detail: OUTPATIENT SURGERY
Discharge: HOME | End: 2022-09-15
Attending: ORTHOPAEDIC SURGERY | Admitting: ORTHOPAEDIC SURGERY
Payer: MEDICARE

## 2022-09-15 ENCOUNTER — ANESTHESIA EVENT (OUTPATIENT)
Dept: PERIOP | Facility: HOSPITAL | Age: 67
End: 2022-09-15
Payer: MEDICARE

## 2022-09-15 ENCOUNTER — ANESTHESIA (OUTPATIENT)
Dept: PERIOP | Facility: HOSPITAL | Age: 67
End: 2022-09-15
Payer: MEDICARE

## 2022-09-15 VITALS
HEIGHT: 70 IN | HEART RATE: 58 BPM | BODY MASS INDEX: 25.05 KG/M2 | OXYGEN SATURATION: 96 % | DIASTOLIC BLOOD PRESSURE: 85 MMHG | WEIGHT: 175 LBS | RESPIRATION RATE: 23 BRPM | TEMPERATURE: 97.8 F | SYSTOLIC BLOOD PRESSURE: 123 MMHG

## 2022-09-15 DIAGNOSIS — R22.32 MASS OF FINGER OF LEFT HAND: Primary | ICD-10-CM

## 2022-09-15 DIAGNOSIS — M79.5 FOREIGN BODY (FB) IN SOFT TISSUE: ICD-10-CM

## 2022-09-15 PROCEDURE — NC001 PR NO CHARGE: Performed by: ORTHOPAEDIC SURGERY

## 2022-09-15 PROCEDURE — 88300 SURGICAL PATH GROSS: CPT | Performed by: STUDENT IN AN ORGANIZED HEALTH CARE EDUCATION/TRAINING PROGRAM

## 2022-09-15 PROCEDURE — 10121 INC&RMVL FB SUBQ TISS COMP: CPT | Performed by: ORTHOPAEDIC SURGERY

## 2022-09-15 PROCEDURE — 10121 INC&RMVL FB SUBQ TISS COMP: CPT | Performed by: PHYSICIAN ASSISTANT

## 2022-09-15 RX ORDER — EPHEDRINE SULFATE 50 MG/ML
INJECTION INTRAVENOUS AS NEEDED
Status: DISCONTINUED | OUTPATIENT
Start: 2022-09-15 | End: 2022-09-15

## 2022-09-15 RX ORDER — FENTANYL CITRATE/PF 50 MCG/ML
25 SYRINGE (ML) INJECTION
Status: DISCONTINUED | OUTPATIENT
Start: 2022-09-15 | End: 2022-09-15 | Stop reason: HOSPADM

## 2022-09-15 RX ORDER — HYDROMORPHONE HCL/PF 1 MG/ML
0.5 SYRINGE (ML) INJECTION
Status: DISCONTINUED | OUTPATIENT
Start: 2022-09-15 | End: 2022-09-15 | Stop reason: HOSPADM

## 2022-09-15 RX ORDER — TRAMADOL HYDROCHLORIDE 50 MG/1
50 TABLET ORAL EVERY 6 HOURS PRN
Status: DISCONTINUED | OUTPATIENT
Start: 2022-09-15 | End: 2022-09-15 | Stop reason: HOSPADM

## 2022-09-15 RX ORDER — CEFAZOLIN SODIUM 2 G/50ML
2000 SOLUTION INTRAVENOUS ONCE
Status: COMPLETED | OUTPATIENT
Start: 2022-09-15 | End: 2022-09-15

## 2022-09-15 RX ORDER — MIDAZOLAM HYDROCHLORIDE 2 MG/2ML
INJECTION, SOLUTION INTRAMUSCULAR; INTRAVENOUS AS NEEDED
Status: DISCONTINUED | OUTPATIENT
Start: 2022-09-15 | End: 2022-09-15

## 2022-09-15 RX ORDER — ONDANSETRON 2 MG/ML
4 INJECTION INTRAMUSCULAR; INTRAVENOUS EVERY 6 HOURS PRN
Status: DISCONTINUED | OUTPATIENT
Start: 2022-09-15 | End: 2022-09-15 | Stop reason: HOSPADM

## 2022-09-15 RX ORDER — ONDANSETRON 2 MG/ML
4 INJECTION INTRAMUSCULAR; INTRAVENOUS ONCE AS NEEDED
Status: DISCONTINUED | OUTPATIENT
Start: 2022-09-15 | End: 2022-09-15 | Stop reason: HOSPADM

## 2022-09-15 RX ORDER — COVID-19 ANTIGEN TEST
220 KIT MISCELLANEOUS 2 TIMES DAILY
Qty: 60 CAPSULE | Refills: 0 | Status: SHIPPED | OUTPATIENT
Start: 2022-09-15 | End: 2022-10-15

## 2022-09-15 RX ORDER — ACETAMINOPHEN 325 MG/1
650 TABLET ORAL EVERY 6 HOURS PRN
Status: DISCONTINUED | OUTPATIENT
Start: 2022-09-15 | End: 2022-09-15 | Stop reason: HOSPADM

## 2022-09-15 RX ORDER — SENNOSIDES 8.6 MG
650 CAPSULE ORAL EVERY 8 HOURS PRN
Qty: 30 TABLET | Refills: 0 | Status: SHIPPED | OUTPATIENT
Start: 2022-09-15

## 2022-09-15 RX ORDER — LIDOCAINE HYDROCHLORIDE 10 MG/ML
INJECTION, SOLUTION EPIDURAL; INFILTRATION; INTRACAUDAL; PERINEURAL AS NEEDED
Status: DISCONTINUED | OUTPATIENT
Start: 2022-09-15 | End: 2022-09-15

## 2022-09-15 RX ORDER — GLYCOPYRROLATE 0.2 MG/ML
INJECTION INTRAMUSCULAR; INTRAVENOUS AS NEEDED
Status: DISCONTINUED | OUTPATIENT
Start: 2022-09-15 | End: 2022-09-15

## 2022-09-15 RX ORDER — PROPOFOL 10 MG/ML
INJECTION, EMULSION INTRAVENOUS AS NEEDED
Status: DISCONTINUED | OUTPATIENT
Start: 2022-09-15 | End: 2022-09-15

## 2022-09-15 RX ORDER — FENTANYL CITRATE 50 UG/ML
INJECTION, SOLUTION INTRAMUSCULAR; INTRAVENOUS AS NEEDED
Status: DISCONTINUED | OUTPATIENT
Start: 2022-09-15 | End: 2022-09-15

## 2022-09-15 RX ORDER — MAGNESIUM HYDROXIDE 1200 MG/15ML
LIQUID ORAL AS NEEDED
Status: DISCONTINUED | OUTPATIENT
Start: 2022-09-15 | End: 2022-09-15 | Stop reason: HOSPADM

## 2022-09-15 RX ORDER — LIDOCAINE HYDROCHLORIDE AND EPINEPHRINE 10; 10 MG/ML; UG/ML
INJECTION, SOLUTION INFILTRATION; PERINEURAL AS NEEDED
Status: DISCONTINUED | OUTPATIENT
Start: 2022-09-15 | End: 2022-09-15 | Stop reason: HOSPADM

## 2022-09-15 RX ORDER — SODIUM CHLORIDE, SODIUM LACTATE, POTASSIUM CHLORIDE, CALCIUM CHLORIDE 600; 310; 30; 20 MG/100ML; MG/100ML; MG/100ML; MG/100ML
INJECTION, SOLUTION INTRAVENOUS CONTINUOUS PRN
Status: DISCONTINUED | OUTPATIENT
Start: 2022-09-15 | End: 2022-09-15

## 2022-09-15 RX ORDER — ONDANSETRON 2 MG/ML
INJECTION INTRAMUSCULAR; INTRAVENOUS AS NEEDED
Status: DISCONTINUED | OUTPATIENT
Start: 2022-09-15 | End: 2022-09-15

## 2022-09-15 RX ADMIN — MIDAZOLAM 2 MG: 1 INJECTION INTRAMUSCULAR; INTRAVENOUS at 08:25

## 2022-09-15 RX ADMIN — SODIUM CHLORIDE, SODIUM LACTATE, POTASSIUM CHLORIDE, AND CALCIUM CHLORIDE: .6; .31; .03; .02 INJECTION, SOLUTION INTRAVENOUS at 08:28

## 2022-09-15 RX ADMIN — FENTANYL CITRATE 50 MCG: 50 INJECTION, SOLUTION INTRAMUSCULAR; INTRAVENOUS at 08:29

## 2022-09-15 RX ADMIN — CEFAZOLIN SODIUM 2000 MG: 2 SOLUTION INTRAVENOUS at 08:26

## 2022-09-15 RX ADMIN — EPHEDRINE SULFATE 10 MG: 50 INJECTION INTRAVENOUS at 08:38

## 2022-09-15 RX ADMIN — GLYCOPYRROLATE 0.02 MCG: 0.4 INJECTION INTRAMUSCULAR; INTRAVENOUS at 08:53

## 2022-09-15 RX ADMIN — PROPOFOL 200 MG: 10 INJECTION, EMULSION INTRAVENOUS at 08:29

## 2022-09-15 RX ADMIN — FENTANYL CITRATE 50 MCG: 50 INJECTION, SOLUTION INTRAMUSCULAR; INTRAVENOUS at 08:36

## 2022-09-15 RX ADMIN — EPHEDRINE SULFATE 10 MG: 50 INJECTION INTRAVENOUS at 08:45

## 2022-09-15 RX ADMIN — GLYCOPYRROLATE 0.02 MCG: 0.4 INJECTION INTRAMUSCULAR; INTRAVENOUS at 08:29

## 2022-09-15 RX ADMIN — ONDANSETRON 4 MG: 2 INJECTION INTRAMUSCULAR; INTRAVENOUS at 08:29

## 2022-09-15 RX ADMIN — LIDOCAINE HYDROCHLORIDE 50 MG: 10 INJECTION, SOLUTION EPIDURAL; INFILTRATION; INTRACAUDAL at 08:29

## 2022-09-15 NOTE — DISCHARGE INSTRUCTIONS
Post Operative Instructions    You have had surgery on your arm today, please read and follow the information below:  Elevate your hand above your elbow during the next 24-48 hours to help with swelling  Place your hand and arm over your head with motion at your shoulder three times a day  Do not apply any cream/ointment/oil to your incisions including antibiotics  Do not soak your hands in standing water (dishwater, tubs, Jacuzzi's, pools, etc ) until given permission (typically 2-3 weeks after injury)    Call the office if you notice any:  Increased numbness or tingling of your hand or fingers that is not relieved with elevation  Increasing pain that is not controlled with medication  Difficulty chewing, breathing, swallowing  Chest pains or shortness of breath  Fever over 101 4 degrees  Bandage: Remove bandage after 5 days  Motion: Move fingers into a fist 5 times a day, DO NOT move any splinted fingers  Weight bearing status: Avoid heavy lifting (>5 pounds) with the extremity that was operated on until follow up appointment  Normal activities of daily living are OK  Ice: Ice for 10 minutes every hour as needed for swelling x 24 hours  Sling: No sling necessary  Medications:   Naproxen 220 mg two times a day   Tylenol Extended Release 650 mg every 8 hours     Follow-up Appointment: 7-10 days  Please call the office if you have any questions or concerns regarding your post-operative care

## 2022-09-15 NOTE — H&P
ASSESSMENT/PLAN:    Assessment:   Foreign Object  left  Thumb in the thenar muscle    Plan:   Foreign Object excision  left  Volar  Thumb in the thenar musculature    Follow Up: After Surgery    To Do Next Visit:  Sutures out    Operative Discussions:  Standard Consent: The risks and benefits of the procedure were explained to the patient, which include, but are not limited to: Bleeding, infection, recurrence, pain, scar, damage to tendons, damage to nerves, and damage to blood vessels, failure to give desired results and complications related to anesthesia  These risks, along with alternative conservative treatment options, and postoperative protocols were voiced back and understood by the patient  All questions were answered to the patient's satisfaction  The patient agrees to comply with a standard postoperative protocol, and is willing to proceed  Education was provided via written and auditory forms  There were no barriers to learning  Written handouts regarding wound care, incision and scar care, and general preoperative information was provided to the patient  Prior to surgery, the patient may be requested to stop all anti-inflammatory medications  Prophylactic aspirin, Plavix, and Coumadin may be allowed to be continued  Medications including vitamin E , ginkgo, and fish oil are requested to be stopped approximately one week prior to surgery  Hypertensive medications and beta blockers, if taken, should be continued  _____________________________________________________  CHIEF COMPLAINT:  No chief complaint on file  SUBJECTIVE:  Nellie Smalls is a 74 y o  male who presents for follow up regarding foreign body in the left hand from 9/11/2021  Patient states that it is bothersome when gripping handlebars and when trying to push up on his hand  He denies any other issues  He did have an US to confirm FB location  He denies any other acute complaints       PAST MEDICAL HISTORY:  Past Medical History:   Diagnosis Date    HL (hearing loss) 2018    Recently more sever    Hypertension 2010 - 2012    resolved    Pneumonia     In past    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    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    Squamous cell skin cancer     left forearm       PAST SURGICAL HISTORY:  Past Surgical History:   Procedure Laterality Date    COLONOSCOPY      EYE SURGERY Bilateral 2019    prebyopia correction    HERNIA REPAIR Bilateral     inguinal       FAMILY HISTORY:  Family History   Problem Relation Age of Onset    Cancer Mother         Colon Cancer    Stroke Father         Passed away at 80    Hypertension Father     Hypertension Brother        SOCIAL HISTORY:  Social History     Tobacco Use    Smoking status: Never Smoker    Smokeless tobacco: Never Used   Vaping Use    Vaping Use: Never used   Substance Use Topics    Alcohol use: Yes     Alcohol/week: 10 0 standard drinks     Types: 10 Cans of beer per week    Drug use: No       MEDICATIONS:    Current Facility-Administered Medications:     ceFAZolin (ANCEF) IVPB (premix in dextrose) 2,000 mg 50 mL, 2,000 mg, Intravenous, Once, Joshua Linton PA-C    ALLERGIES:  No Known Allergies    REVIEW OF SYSTEMS:  Pertinent items are noted in HPI  A comprehensive review of systems was negative      LABS:  HgA1c: No results found for: HGBA1C  BMP:   Lab Results   Component Value Date    CALCIUM 9 4 07/29/2022    K 4 7 07/29/2022    CO2 31 07/29/2022     07/29/2022    BUN 18 07/29/2022    CREATININE 1 11 07/29/2022           _____________________________________________________  PHYSICAL EXAMINATION:  Vital signs: /81   Pulse 55   Temp 98 °F (36 7 °C) (Oral)   Resp 16   Ht 5' 10" (1 778 m)   Wt 79 4 kg (175 lb)   SpO2 97%   BMI 25 11 kg/m²   General: well developed and well nourished, alert, oriented times 3 and appears comfortable  Psychiatric: Normal  HEENT: Trachea Midline, No torticollis  Cardiovascular: No discernable arrhythmia  Pulmonary: No wheezing or stridor  Abdomen: No rebound or guarding  Extremities: No peripheral edema  Skin: No masses, erythema, lacerations, fluctation, ulcerations  Neurovascular: Sensation Intact to the Median, Ulnar, Radial Nerve, Motor Intact to the Median, Ulnar, Radial Nerve and Pulses Intact    MUSCULOSKELETAL EXAMINATION:  Left thumb:  Tenderness over the thenar muscle  No discernable FB palpable on exam today  No wound or erythema in the thenar muscle  Full ROM of thumb  Sensation intact    _____________________________________________________  STUDIES REVIEWED:  Images were reviewed in PACS by Dr Pito Cardenas and demonstrate: foreign body in the left thenar muscle      PROCEDURES PERFORMED:  Procedures  No Procedures performed today

## 2022-09-15 NOTE — ANESTHESIA POSTPROCEDURE EVALUATION
Post-Op Assessment Note    CV Status:  Stable  Pain Score: 0    Pain management: adequate     Mental Status:  Alert and awake   Hydration Status:  Stable   PONV Controlled:  None   Airway Patency:  Patent      Post Op Vitals Reviewed: Yes      Staff: CRNA         No complications documented      /74 (09/15/22 0941)    Temp (!) 97 2 °F (36 2 °C) (09/15/22 0941)    Pulse 79 (09/15/22 0941)   Resp 16 (09/15/22 0941)    SpO2 98 % (09/15/22 0941)

## 2022-09-15 NOTE — ANESTHESIA PREPROCEDURE EVALUATION
Procedure:  REMOVAL FOREIGN BODY EXTREMITY - left thumb (Left Thumb)    Relevant Problems   CARDIO   (+) Hypertension   (+) Mixed hyperlipidemia   (+) Sinus bradycardia        Physical Exam    Airway    Mallampati score: II  TM Distance: >3 FB  Neck ROM: full     Dental   No notable dental hx     Cardiovascular  Rate: normal,     Pulmonary  Pulmonary exam normal     Other Findings        Anesthesia Plan  ASA Score- 2     Anesthesia Type- general with ASA Monitors  Additional Monitors:   Airway Plan: LMA  Plan Factors-    Chart reviewed  EKG reviewed  Existing labs reviewed  Patient summary reviewed  Obstructive sleep apnea risk education given perioperatively  Induction- intravenous  Postoperative Plan-     Informed Consent- Anesthetic plan and risks discussed with patient  I personally reviewed this patient with the CRNA  Discussed and agreed on the Anesthesia Plan with the CRNA  Hollis Burciaga

## 2022-09-15 NOTE — OP NOTE
OPERATIVE REPORT  PATIENT NAME: Wojciech Arndt  :    MRN: [id number]  Pt Location: [facility] MAIN OR    SURGERY DATE: 09/15/22    Surgeon(s) and Role:     * Leopold Mitts, MD - Primary     * Marco Tijerina PA-C - Assisting    Pre-Op Diagnosis:  Foreign body (FB) in soft tissue [M79 5]    Post-Op Diagnosis:    Foreign body (FB) in soft tissue [M79 5]    Procedure(s) (LRB):  REMOVAL FOREIGN BODY EXTREMITY - left thumb 20 mm x 7 mm x 2 mm (Left)    Specimen(s):  Order Name Source Comment Collection Info Order Time   TISSUE EXAM Hand, Left gross specimen of foreign object of left thumb Collected By: Leopold Mitts, MD 9/15/2022  9:17 AM     Release to patient through Mychart   Immediate            Estimated Blood Loss:   Minimal      Anesthesia Type:   General    Operative Indications: The patient has a history of a foreign object to the left thumb that was recalcitrant to conservative management  The decision was made to bring the patient to the operating room for foreign object to the left thumb  Risks of the procedure were explained which include, but are not limited to bleeding; infection; damage to nerves, arteries,veins, tendons; scar; pain; need for reoperation; failure to give desired result; and risks of anaesthesia  All questions were answered to satisfaction and they were willing to proceed  Operative Findings:  Avoid piece measuring 20 mm x 7 mm x 2 mm deep to the thenar musculature and just above the flexor pollicis longus    Complications:   None    Procedure and Technique:  After the patient, site, and procedure were identified, the patient was brought into the operating room in a supine position  General anaesthesia and local medication were provided  A well padded tourniquet was applied to the extremity, set at 250 mmHg  The  left upper extremity was then prepped and drapped in a normal, sterile, orthopedic fashion      After the patient, site, and procedure identified attention was turned towards the left arm  An Esmarch bandage was used to exsanguinate the limb the tourniquet was inflated 250 mmHg  Longitudinal incision was made over the thumb  We dissected down through skin subcutaneous tissues maintaining hemostasis  We were able to retract the thenar musculature proximally  Distally, we did identify the neurovascular bundle going to the thumb  This was mobilized and protected throughout the entirety of the procedure  What appeared to be a well capsulated area deep to the thenar musculature was identified  This was then opened  Seromatous fluid was then identified and was irrigated and evacuated  A large wooden foreign object was identified  This was then removed in its entirety  It measured 20 mm x 7 mm x 2 mm  The wound was then copiously irrigated  Tourniquet was then deflated demonstrating rapid restoration of blood supply to the hand  Local medication was then injected  At the completion of the procedure, hemostasis was obtained with cautery and direct pressure  The wounds were copiously irrigated with sterile solution  The wounds were closed with Prolene  Sterile dressings were applied, including Xeroform, gauze, tweeners, webril, ACE  Please note, all sponge, needle, and instrument counts were correct prior to closure  Loupe magnification was utilized  The patient tolerated the procedure well       I was present for all critical portions of the procedure, A qualified resident physician was not available and A physician assistant was required during the procedure for retraction tissue handling,dissection and suturing    Patient Disposition:  PACU , hemodynamically stable and extubated and stable    SIGNATURE: Jesenia Lin MD  DATE: 09/15/22  TIME: 9:57 AM

## 2022-09-19 PROCEDURE — 88300 SURGICAL PATH GROSS: CPT | Performed by: STUDENT IN AN ORGANIZED HEALTH CARE EDUCATION/TRAINING PROGRAM

## 2022-10-04 ENCOUNTER — TELEPHONE (OUTPATIENT)
Dept: GASTROENTEROLOGY | Facility: AMBULARY SURGERY CENTER | Age: 67
End: 2022-10-04

## 2022-10-20 DIAGNOSIS — E78.2 MIXED HYPERLIPIDEMIA: ICD-10-CM

## 2022-10-20 RX ORDER — ROSUVASTATIN CALCIUM 10 MG/1
TABLET, COATED ORAL
Qty: 90 TABLET | Refills: 1 | Status: SHIPPED | OUTPATIENT
Start: 2022-10-20

## 2022-12-12 PROBLEM — H90.5 SENSORINEURAL HEARING LOSS (SNHL) OF RIGHT EAR: Status: ACTIVE | Noted: 2019-11-12

## 2022-12-23 ENCOUNTER — APPOINTMENT (OUTPATIENT)
Dept: LAB | Facility: AMBULARY SURGERY CENTER | Age: 67
End: 2022-12-23

## 2022-12-23 DIAGNOSIS — H90.41 SENSORINEURAL HEARING LOSS (SNHL) OF RIGHT EAR WITH UNRESTRICTED HEARING OF LEFT EAR: ICD-10-CM

## 2022-12-23 LAB
ANION GAP SERPL CALCULATED.3IONS-SCNC: 5 MMOL/L (ref 4–13)
BUN SERPL-MCNC: 20 MG/DL (ref 5–25)
CALCIUM SERPL-MCNC: 9.3 MG/DL (ref 8.3–10.1)
CHLORIDE SERPL-SCNC: 104 MMOL/L (ref 96–108)
CO2 SERPL-SCNC: 30 MMOL/L (ref 21–32)
CREAT SERPL-MCNC: 0.96 MG/DL (ref 0.6–1.3)
GFR SERPL CREATININE-BSD FRML MDRD: 82 ML/MIN/1.73SQ M
GLUCOSE SERPL-MCNC: 93 MG/DL (ref 65–140)
POTASSIUM SERPL-SCNC: 4.2 MMOL/L (ref 3.5–5.3)
SODIUM SERPL-SCNC: 139 MMOL/L (ref 135–147)

## 2022-12-28 ENCOUNTER — HOSPITAL ENCOUNTER (OUTPATIENT)
Dept: MRI IMAGING | Facility: HOSPITAL | Age: 67
Discharge: HOME | End: 2022-12-28

## 2022-12-28 DIAGNOSIS — H90.41 SENSORINEURAL HEARING LOSS (SNHL) OF RIGHT EAR WITH UNRESTRICTED HEARING OF LEFT EAR: ICD-10-CM

## 2022-12-28 RX ADMIN — GADOBUTROL 7 ML: 604.72 INJECTION INTRAVENOUS at 10:44

## 2023-01-13 ENCOUNTER — APPOINTMENT (OUTPATIENT)
Dept: LAB | Facility: CLINIC | Age: 68
End: 2023-01-13

## 2023-01-13 DIAGNOSIS — E78.2 MIXED HYPERLIPIDEMIA: ICD-10-CM

## 2023-01-13 DIAGNOSIS — Z12.5 SCREENING FOR PROSTATE CANCER: ICD-10-CM

## 2023-01-13 DIAGNOSIS — I10 PRIMARY HYPERTENSION: ICD-10-CM

## 2023-01-13 LAB
ALBUMIN SERPL BCP-MCNC: 4 G/DL (ref 3.5–5)
ALP SERPL-CCNC: 52 U/L (ref 46–116)
ALT SERPL W P-5'-P-CCNC: 28 U/L (ref 12–78)
ANION GAP SERPL CALCULATED.3IONS-SCNC: 5 MMOL/L (ref 4–13)
AST SERPL W P-5'-P-CCNC: 22 U/L (ref 5–45)
BILIRUB SERPL-MCNC: 1.82 MG/DL (ref 0.2–1)
BILIRUB UR QL STRIP: NEGATIVE
BUN SERPL-MCNC: 19 MG/DL (ref 5–25)
CALCIUM SERPL-MCNC: 9.4 MG/DL (ref 8.3–10.1)
CHLORIDE SERPL-SCNC: 108 MMOL/L (ref 96–108)
CHOLEST SERPL-MCNC: 209 MG/DL
CLARITY UR: CLEAR
CO2 SERPL-SCNC: 27 MMOL/L (ref 21–32)
COLOR UR: ABNORMAL
CREAT SERPL-MCNC: 1.17 MG/DL (ref 0.6–1.3)
ERYTHROCYTE [DISTWIDTH] IN BLOOD BY AUTOMATED COUNT: 13.2 % (ref 11.6–15.1)
GFR SERPL CREATININE-BSD FRML MDRD: 65 ML/MIN/1.73SQ M
GLUCOSE P FAST SERPL-MCNC: 91 MG/DL (ref 65–99)
GLUCOSE UR STRIP-MCNC: NEGATIVE MG/DL
HCT VFR BLD AUTO: 48.4 % (ref 36.5–49.3)
HDLC SERPL-MCNC: 81 MG/DL
HGB BLD-MCNC: 15.5 G/DL (ref 12–17)
HGB UR QL STRIP.AUTO: NEGATIVE
KETONES UR STRIP-MCNC: ABNORMAL MG/DL
LDLC SERPL CALC-MCNC: 116 MG/DL (ref 0–100)
LEUKOCYTE ESTERASE UR QL STRIP: NEGATIVE
MCH RBC QN AUTO: 28.2 PG (ref 26.8–34.3)
MCHC RBC AUTO-ENTMCNC: 32 G/DL (ref 31.4–37.4)
MCV RBC AUTO: 88 FL (ref 82–98)
NITRITE UR QL STRIP: NEGATIVE
NONHDLC SERPL-MCNC: 128 MG/DL
PH UR STRIP.AUTO: 5.5 [PH]
PLATELET # BLD AUTO: 284 THOUSANDS/UL (ref 149–390)
PMV BLD AUTO: 10.7 FL (ref 8.9–12.7)
POTASSIUM SERPL-SCNC: 4.8 MMOL/L (ref 3.5–5.3)
PROT SERPL-MCNC: 7.6 G/DL (ref 6.4–8.4)
PROT UR STRIP-MCNC: NEGATIVE MG/DL
PSA SERPL-MCNC: 0.5 NG/ML (ref 0–4)
RBC # BLD AUTO: 5.5 MILLION/UL (ref 3.88–5.62)
SODIUM SERPL-SCNC: 140 MMOL/L (ref 135–147)
SP GR UR STRIP.AUTO: 1.02 (ref 1–1.03)
TRIGL SERPL-MCNC: 60 MG/DL
TSH SERPL DL<=0.05 MIU/L-ACNC: 1.31 UIU/ML (ref 0.45–4.5)
UROBILINOGEN UR STRIP-ACNC: <2 MG/DL
WBC # BLD AUTO: 4.95 THOUSAND/UL (ref 4.31–10.16)

## 2023-01-20 ENCOUNTER — RA CDI HCC (OUTPATIENT)
Dept: OTHER | Facility: HOSPITAL | Age: 68
End: 2023-01-20

## 2023-01-20 NOTE — PROGRESS NOTES
[address]  coding opportunities       Chart reviewed, no opportunity found: CHART REVIEWED, NO OPPORTUNITY FOUND        Patients Insurance     Medicare Insurance: Medicare

## 2023-01-27 ENCOUNTER — OFFICE VISIT (OUTPATIENT)
Dept: INTERNAL MEDICINE CLINIC | Facility: CLINIC | Age: 68
End: 2023-01-27

## 2023-01-27 VITALS
TEMPERATURE: 96.8 F | BODY MASS INDEX: 25 KG/M2 | OXYGEN SATURATION: 98 % | HEART RATE: 58 BPM | DIASTOLIC BLOOD PRESSURE: 80 MMHG | SYSTOLIC BLOOD PRESSURE: 132 MMHG | HEIGHT: 70 IN | WEIGHT: 174.6 LBS

## 2023-01-27 DIAGNOSIS — I10 HYPERTENSION, UNSPECIFIED TYPE: Primary | ICD-10-CM

## 2023-01-27 DIAGNOSIS — H93.A1 PULSATILE TINNITUS OF RIGHT EAR: ICD-10-CM

## 2023-01-27 DIAGNOSIS — E78.2 MIXED HYPERLIPIDEMIA: ICD-10-CM

## 2023-01-27 PROBLEM — R03.0 ELEVATED BP WITHOUT DIAGNOSIS OF HYPERTENSION: Status: RESOLVED | Noted: 2021-01-07 | Resolved: 2023-01-27

## 2023-01-27 PROBLEM — E83.51 HYPOCALCEMIA: Status: RESOLVED | Noted: 2020-12-17 | Resolved: 2023-01-27

## 2023-01-27 PROBLEM — R19.09 GROIN LUMP: Status: RESOLVED | Noted: 2021-01-07 | Resolved: 2023-01-27

## 2023-01-27 NOTE — PROGRESS NOTES
Assessment/Plan:    Problem List Items Addressed This Visit        Cardiovascular and Mediastinum    Hypertension - Primary     -benign, home bp are controlled  -continue amlodipine 5mg daily, additional dose if BP > 140/90         Relevant Orders    Echo complete w/ contrast if indicated    Lipid panel    Comprehensive metabolic panel       Nervous and Auditory    Pulsatile tinnitus of right ear     -associated with SNHL  -MRI brain with EAC unremarkable  -tinnitus changed to pulsatile in nature  -recommend echo         Relevant Orders    Echo complete w/ contrast if indicated       Other    Mixed hyperlipidemia     -slightly increased due to change in activity but acceptable  -continue rosuvastatin 10mg daily         Relevant Orders    Echo complete w/ contrast if indicated    Lipid panel    Comprehensive metabolic panel       Subjective:      Patient ID: Ton Ruggiero is a 74 y o  male  HPI  72yo male with HTN, HLD here for follow up care  He is retiring in April  In the winter he does not train as hard  No significant weight change  Home BP around 120/70s  Has had to take an additional dose of amlodipine five times since his last visit  Had recurrence of R vertigo in October  Evaluated by ENT, had MRI EAC which was neg  Has had chronic tinnitus which has changed to pulsatile in R ear  The following portions of the patient's history were reviewed and updated as appropriate: allergies, current medications, past family history, past medical history, past social history, past surgical history and problem list       Current Outpatient Medications:   •  amLODIPine (NORVASC) 5 mg tablet, TAKE 1 TAB IN THE MORNING, MAY TAKE ADDITIONAL 1 TAB IF BP >140/90, Disp: 180 tablet, Rfl: 1  •  rosuvastatin (CRESTOR) 10 MG tablet, TAKE 1 TABLET BY MOUTH EVERY DAY, Disp: 90 tablet, Rfl: 1    Review of Systems   Constitutional: Positive for appetite change   Negative for activity change and unexpected weight change  HENT: Positive for tinnitus  Respiratory: Negative for cough, chest tightness, shortness of breath and wheezing  Cardiovascular: Negative for chest pain, palpitations and leg swelling  Neurological: Positive for light-headedness  Negative for headaches  Objective:    /80 (BP Location: Left arm, Patient Position: Sitting)   Pulse 58   Temp (!) 96 8 °F (36 °C) (Tympanic)   Ht 5' 10" (1 778 m)   Wt 79 2 kg (174 lb 9 6 oz)   SpO2 98%   BMI 25 05 kg/m²      Physical Exam  Vitals reviewed  Constitutional:       General: He is not in acute distress  Appearance: He is normal weight  Neck:      Vascular: No carotid bruit  Cardiovascular:      Rate and Rhythm: Normal rate and regular rhythm  Pulses: Normal pulses  Heart sounds: Normal heart sounds  Pulmonary:      Effort: Pulmonary effort is normal  No respiratory distress  Breath sounds: Normal breath sounds  No wheezing  Musculoskeletal:      Cervical back: Neck supple  Right lower leg: No edema  Left lower leg: No edema  Neurological:      Mental Status: He is alert             Recent Results (from the past 672 hour(s))   PSA, Total Screen    Collection Time: 01/13/23  8:42 AM   Result Value Ref Range    PSA 0 5 0 0 - 4 0 ng/mL   Lipid panel    Collection Time: 01/13/23  8:42 AM   Result Value Ref Range    Cholesterol 209 (H) See Comment mg/dL    Triglycerides 60 See Comment mg/dL    HDL, Direct 81 >=40 mg/dL    LDL Calculated 116 (H) 0 - 100 mg/dL    Non-HDL-Chol (CHOL-HDL) 128 mg/dl   Comprehensive metabolic panel    Collection Time: 01/13/23  8:42 AM   Result Value Ref Range    Sodium 140 135 - 147 mmol/L    Potassium 4 8 3 5 - 5 3 mmol/L    Chloride 108 96 - 108 mmol/L    CO2 27 21 - 32 mmol/L    ANION GAP 5 4 - 13 mmol/L    BUN 19 5 - 25 mg/dL    Creatinine 1 17 0 60 - 1 30 mg/dL    Glucose, Fasting 91 65 - 99 mg/dL    Calcium 9 4 8 3 - 10 1 mg/dL    AST 22 5 - 45 U/L    ALT 28 12 - 78 U/L Alkaline Phosphatase 52 46 - 116 U/L    Total Protein 7 6 6 4 - 8 4 g/dL    Albumin 4 0 3 5 - 5 0 g/dL    Total Bilirubin 1 82 (H) 0 20 - 1 00 mg/dL    eGFR 65 ml/min/1 73sq m   TSH, 3rd generation with Free T4 reflex    Collection Time: 01/13/23  8:42 AM   Result Value Ref Range    TSH 3RD GENERATON 1 310 0 450 - 4 500 uIU/mL   UA (URINE) with reflex to Scope    Collection Time: 01/13/23  8:42 AM   Result Value Ref Range    Color, UA Light Yellow     Clarity, UA Clear     Specific Gravity, UA 1 021 1 003 - 1 030    pH, UA 5 5 4 5, 5 0, 5 5, 6 0, 6 5, 7 0, 7 5, 8 0    Leukocytes, UA Negative Negative    Nitrite, UA Negative Negative    Protein, UA Negative Negative mg/dl    Glucose, UA Negative Negative mg/dl    Ketones, UA Trace (A) Negative mg/dl    Urobilinogen, UA <2 0 <2 0 mg/dl mg/dl    Bilirubin, UA Negative Negative    Occult Blood, UA Negative Negative   CBC    Collection Time: 01/13/23  8:42 AM   Result Value Ref Range    WBC 4 95 4 31 - 10 16 Thousand/uL    RBC 5 50 3 88 - 5 62 Million/uL    Hemoglobin 15 5 12 0 - 17 0 g/dL    Hematocrit 48 4 36 5 - 49 3 %    MCV 88 82 - 98 fL    MCH 28 2 26 8 - 34 3 pg    MCHC 32 0 31 4 - 37 4 g/dL    RDW 13 2 11 6 - 15 1 %    Platelets 878 482 - 590 Thousands/uL    MPV 10 7 8 9 - 12 7 fL

## 2023-01-27 NOTE — ASSESSMENT & PLAN NOTE
-associated with SNHL  -MRI brain with EAC unremarkable  -tinnitus changed to pulsatile in nature  -recommend echo

## 2023-02-16 ENCOUNTER — HOSPITAL ENCOUNTER (OUTPATIENT)
Dept: NON INVASIVE DIAGNOSTICS | Facility: HOSPITAL | Age: 68
Discharge: HOME | End: 2023-02-16

## 2023-02-16 VITALS
SYSTOLIC BLOOD PRESSURE: 167 MMHG | BODY MASS INDEX: 25 KG/M2 | HEIGHT: 70 IN | WEIGHT: 174.6 LBS | DIASTOLIC BLOOD PRESSURE: 90 MMHG | HEART RATE: 62 BPM

## 2023-02-16 DIAGNOSIS — H93.A1 PULSATILE TINNITUS OF RIGHT EAR: ICD-10-CM

## 2023-02-16 DIAGNOSIS — I10 HYPERTENSION, UNSPECIFIED TYPE: ICD-10-CM

## 2023-02-16 DIAGNOSIS — E78.2 MIXED HYPERLIPIDEMIA: ICD-10-CM

## 2023-02-17 LAB
AORTIC ROOT: 3.6 CM
APICAL FOUR CHAMBER EJECTION FRACTION: 64 %
E WAVE DECELERATION TIME: 196 MS
FRACTIONAL SHORTENING: 44 (ref 28–44)
INTERVENTRICULAR SEPTUM IN DIASTOLE (PARASTERNAL SHORT AXIS VIEW): 1 CM
INTERVENTRICULAR SEPTUM: 1 CM (ref 0.6–1.1)
LAAS-AP2: 26.7 CM2
LAAS-AP4: 24.4 CM2
LEFT ATRIUM SIZE: 3.7 CM
LEFT INTERNAL DIMENSION IN SYSTOLE: 3.1 CM (ref 2.1–4)
LEFT VENTRICULAR INTERNAL DIMENSION IN DIASTOLE: 5.5 CM (ref 3.5–6)
LEFT VENTRICULAR POSTERIOR WALL IN END DIASTOLE: 1 CM
LEFT VENTRICULAR STROKE VOLUME: 107 ML
LVSV (TEICH): 107 ML
MV E'TISSUE VEL-SEP: 13 CM/S
MV PEAK A VEL: 0.58 M/S
MV PEAK E VEL: 77 CM/S
MV STENOSIS PRESSURE HALF TIME: 57 MS
MV VALVE AREA P 1/2 METHOD: 3.86
RIGHT ATRIUM AREA SYSTOLE A4C: 22.6 CM2
RIGHT VENTRICLE ID DIMENSION: 4 CM
SL CV LEFT ATRIUM LENGTH A2C: 5.9 CM
SL CV LV EF: 60
SL CV PED ECHO LEFT VENTRICLE DIASTOLIC VOLUME (MOD BIPLANE) 2D: 145 ML
SL CV PED ECHO LEFT VENTRICLE SYSTOLIC VOLUME (MOD BIPLANE) 2D: 37 ML
TRICUSPID ANNULAR PLANE SYSTOLIC EXCURSION: 2.9 CM

## 2023-05-01 DIAGNOSIS — E78.2 MIXED HYPERLIPIDEMIA: ICD-10-CM

## 2023-05-01 RX ORDER — ROSUVASTATIN CALCIUM 10 MG/1
TABLET, COATED ORAL
Qty: 90 TABLET | Refills: 1 | Status: SHIPPED | OUTPATIENT
Start: 2023-05-01

## 2023-07-18 ENCOUNTER — APPOINTMENT (OUTPATIENT)
Dept: LAB | Facility: CLINIC | Age: 68
End: 2023-07-18
Payer: MEDICARE

## 2023-07-18 DIAGNOSIS — I10 HYPERTENSION, UNSPECIFIED TYPE: ICD-10-CM

## 2023-07-18 DIAGNOSIS — E78.2 MIXED HYPERLIPIDEMIA: ICD-10-CM

## 2023-07-18 LAB
ALBUMIN SERPL BCP-MCNC: 3.8 G/DL (ref 3.5–5)
ALP SERPL-CCNC: 45 U/L (ref 46–116)
ALT SERPL W P-5'-P-CCNC: 36 U/L (ref 12–78)
ANION GAP SERPL CALCULATED.3IONS-SCNC: 0 MMOL/L
AST SERPL W P-5'-P-CCNC: 29 U/L (ref 5–45)
BILIRUB SERPL-MCNC: 1.27 MG/DL (ref 0.2–1)
BUN SERPL-MCNC: 20 MG/DL (ref 5–25)
CALCIUM SERPL-MCNC: 9.2 MG/DL (ref 8.3–10.1)
CHLORIDE SERPL-SCNC: 106 MMOL/L (ref 96–108)
CHOLEST SERPL-MCNC: 247 MG/DL
CO2 SERPL-SCNC: 30 MMOL/L (ref 21–32)
CREAT SERPL-MCNC: 1.04 MG/DL (ref 0.6–1.3)
GFR SERPL CREATININE-BSD FRML MDRD: 74 ML/MIN/1.73SQ M
GLUCOSE P FAST SERPL-MCNC: 93 MG/DL (ref 65–99)
HDLC SERPL-MCNC: 93 MG/DL
LDLC SERPL CALC-MCNC: 144 MG/DL (ref 0–100)
NONHDLC SERPL-MCNC: 154 MG/DL
POTASSIUM SERPL-SCNC: 4.9 MMOL/L (ref 3.5–5.3)
PROT SERPL-MCNC: 7.4 G/DL (ref 6.4–8.4)
SODIUM SERPL-SCNC: 136 MMOL/L (ref 135–147)
TRIGL SERPL-MCNC: 51 MG/DL

## 2023-07-18 PROCEDURE — 80061 LIPID PANEL: CPT

## 2023-07-18 PROCEDURE — 80053 COMPREHEN METABOLIC PANEL: CPT

## 2023-07-18 PROCEDURE — 36415 COLL VENOUS BLD VENIPUNCTURE: CPT

## 2023-07-28 ENCOUNTER — OFFICE VISIT (OUTPATIENT)
Dept: INTERNAL MEDICINE CLINIC | Facility: CLINIC | Age: 68
End: 2023-07-28

## 2023-07-28 VITALS
SYSTOLIC BLOOD PRESSURE: 128 MMHG | TEMPERATURE: 98 F | HEART RATE: 59 BPM | OXYGEN SATURATION: 98 % | BODY MASS INDEX: 23.53 KG/M2 | WEIGHT: 164 LBS | DIASTOLIC BLOOD PRESSURE: 80 MMHG

## 2023-07-28 DIAGNOSIS — R29.818 SUSPECTED SLEEP APNEA: Primary | ICD-10-CM

## 2023-07-28 DIAGNOSIS — E78.2 MIXED HYPERLIPIDEMIA: ICD-10-CM

## 2023-07-28 DIAGNOSIS — R06.83 SNORING: ICD-10-CM

## 2023-07-28 DIAGNOSIS — Z23 ENCOUNTER FOR IMMUNIZATION: ICD-10-CM

## 2023-07-28 DIAGNOSIS — Z00.00 MEDICARE ANNUAL WELLNESS VISIT, SUBSEQUENT: ICD-10-CM

## 2023-07-28 DIAGNOSIS — I51.7 LAE (LEFT ATRIAL ENLARGEMENT): ICD-10-CM

## 2023-07-28 DIAGNOSIS — Z12.5 SCREENING FOR PROSTATE CANCER: ICD-10-CM

## 2023-07-28 DIAGNOSIS — G47.19 EXCESSIVE DAYTIME SLEEPINESS: ICD-10-CM

## 2023-07-28 DIAGNOSIS — Z80.0 FAMILY HISTORY OF COLORECTAL CANCER: ICD-10-CM

## 2023-07-28 DIAGNOSIS — Z86.010 PERSONAL HISTORY OF COLONIC POLYPS: ICD-10-CM

## 2023-07-28 DIAGNOSIS — I10 PRIMARY HYPERTENSION: ICD-10-CM

## 2023-07-28 NOTE — ASSESSMENT & PLAN NOTE
-reports myalgia on rosuvastatin 10mg daily. Agreeable to trial of modified dosing of three times per week or as tolerated. May also reduce dose to 5mg or switch to pravastatin.   -repeat lipid panel prior to next visit

## 2023-07-28 NOTE — PROGRESS NOTES
Assessment and Plan:     Problem List Items Addressed This Visit        Cardiovascular and Mediastinum    Hypertension     -normotensive  -continue physical activity as tolerated  -continue amlodipine 5mg daily         Relevant Orders    Home Study    Lipid panel    Comprehensive metabolic panel    TSH, 3rd generation with Free T4 reflex    CBC    UA (URINE) with reflex to Scope       Other    Mixed hyperlipidemia     -reports myalgia on rosuvastatin 10mg daily. Agreeable to trial of modified dosing of three times per week or as tolerated. May also reduce dose to 5mg or switch to pravastatin. -repeat lipid panel prior to next visit         Relevant Orders    Lipid panel    Comprehensive metabolic panel    TSH, 3rd generation with Free T4 reflex   Other Visit Diagnoses     Suspected sleep apnea    -  Primary    -daytime sleepiness, snoring and LAE suspicious for ONEYDA  -refer for HST    Relevant Orders    Home Study    Excessive daytime sleepiness        Relevant Orders    Home Study    LAE (left atrial enlargement)        Relevant Orders    Home Study    Snoring        Relevant Orders    Home Study    Medicare annual wellness visit, subsequent        Personal history of colonic polyps        Relevant Orders    Ambulatory referral to Gastroenterology    Family history of colorectal cancer        Relevant Orders    Ambulatory referral to Gastroenterology    Screening for prostate cancer        Relevant Orders    PSA, Total Screen    Encounter for immunization        Relevant Orders    TDAP VACCINE GREATER THAN OR EQUAL TO 6YO IM (Completed)           Preventive health issues were discussed with patient, and age appropriate screening tests were ordered as noted in patient's After Visit Summary. Personalized health advice and appropriate referrals for health education or preventive services given if needed, as noted in patient's After Visit Summary.      History of Present Illness:     Patient presents for a Medicare Wellness Visit    HPI     Stopped statin mid June. Reports he experienced muscle pain when he exercised. Muscle pain resolved. Home /68. He has been reported to stop breathing in the evening, he snores and admits to daytime sleepiness. Patient Care Team:  Oscar Atkinson DO as PCP - Juan Antonio Syed MD (Orthopedic Surgery)  Douglas Urrutia MD (Gastroenterology)     Review of Systems:     Review of Systems   Constitutional: Positive for activity change. Negative for appetite change and unexpected weight change. HENT: Positive for hearing loss and tinnitus. Respiratory: Negative for cough, chest tightness, shortness of breath and wheezing. Cardiovascular: Negative for chest pain, palpitations and leg swelling. Gastrointestinal: Negative for abdominal pain, constipation and diarrhea. Genitourinary: Negative for difficulty urinating. Musculoskeletal: Negative for arthralgias. Neurological: Positive for dizziness. Negative for headaches. Psychiatric/Behavioral: Negative for dysphoric mood and sleep disturbance.         Problem List:     Patient Active Problem List   Diagnosis   • Vertigo   • Sinus bradycardia   • Mixed hyperlipidemia   • Tenosynovitis of hand   • Family history of colon cancer   • Sensorineural hearing loss (SNHL) of right ear   • Hypertension   • Pulsatile tinnitus of right ear      Past Medical and Surgical History:     Past Medical History:   Diagnosis Date   • Elevated BP without diagnosis of hypertension 1/7/2021   • Groin lump 1/7/2021   • HL (hearing loss) 2018    Recently more sever   • Hypertension 2010 - 2012    resolved   • Hypocalcemia 12/17/2020   • Pneumonia     In past   • 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   • 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   • Squamous cell skin cancer     left forearm     Past Surgical History:   Procedure Laterality Date   • COLONOSCOPY     • EYE SURGERY Bilateral 2019 prebyopia correction   • HERNIA REPAIR Bilateral     inguinal   • CT INCISION & REMOVAL FOREIGN BODY SUBQ TISS SIMPLE Left 9/15/2022    Procedure: REMOVAL FOREIGN BODY EXTREMITY - left thumb 20 mm x 7 mm x 2 mm;  Surgeon: Lucy Bay MD;  Location:  MAIN OR;  Service: Orthopedics      Family History:     Family History   Problem Relation Age of Onset   • Cancer Mother         Colon Cancer   • Stroke Father         Passed away at 80   • Hypertension Father    • Hypertension Brother       Social History:     Social History     Socioeconomic History   • Marital status: /Civil Union     Spouse name: None   • Number of children: None   • Years of education: None   • Highest education level: None   Occupational History   • Occupation: pharmacist   Tobacco Use   • Smoking status: Never   • Smokeless tobacco: Never   Vaping Use   • Vaping Use: Never used   Substance and Sexual Activity   • Alcohol use: Yes     Alcohol/week: 10.0 standard drinks of alcohol     Types: 10 Cans of beer per week   • Drug use: No   • Sexual activity: Yes     Partners: Female     Birth control/protection: None     Comment:  single partner   Other Topics Concern   • None   Social History Narrative         Social Determinants of Health     Financial Resource Strain: Low Risk  (7/28/2023)    Overall Financial Resource Strain (CARDIA)    • Difficulty of Paying Living Expenses: Not hard at all   Food Insecurity: Not on file   Transportation Needs: No Transportation Needs (7/28/2023)    PRAPARE - Transportation    • Lack of Transportation (Medical): No    • Lack of Transportation (Non-Medical):  No   Physical Activity: Not on file   Stress: Not on file   Social Connections: Not on file   Intimate Partner Violence: Not on file   Housing Stability: Not on file      Medications and Allergies:     Current Outpatient Medications   Medication Sig Dispense Refill   • amLODIPine (NORVASC) 5 mg tablet TAKE 1 TAB IN THE MORNING, MAY TAKE ADDITIONAL 1 TAB IF BP >140/90 180 tablet 1   • rosuvastatin (CRESTOR) 10 MG tablet TAKE 1 TABLET BY MOUTH EVERY DAY (Patient not taking: Reported on 7/28/2023) 90 tablet 1     No current facility-administered medications for this visit. No Known Allergies   Immunizations:     Immunization History   Administered Date(s) Administered   • COVID-19 PFIZER VACCINE 0.3 ML IM 04/23/2021, 06/19/2021, 12/16/2021   • COVID-19 Pfizer Vac BIVALENT Florentino-sucrose 12 Yr+ IM (BOOSTER ONLY) 10/19/2022   • INFLUENZA 09/12/2018   • Influenza, high dose seasonal 0.7 mL 11/01/2022   • Influenza, recombinant, quadrivalent,injectable, preservative free 10/11/2021   • Influenza, seasonal, injectable 10/10/2012, 09/01/2016, 09/01/2017   • MMR 10/31/2012   • Pneumococcal Conjugate Vaccine 20-valent (Pcv20), Polysace 07/15/2022   • Tdap 10/31/2012, 01/01/2013, 07/28/2023   • Tuberculin Skin Test 10/29/2012, 10/29/2012   • Tuberculin Skin Test-PPD Intradermal 10/29/2012   • Typhoid Live, oral 06/11/2018   • Zoster Vaccine Recombinant 01/07/2021, 07/02/2021      Health Maintenance:         Topic Date Due   • Colorectal Cancer Screening  12/05/2022   • Hepatitis C Screening  Completed         Topic Date Due   • COVID-19 Vaccine (5 - Pfizer series) 02/19/2023   • Influenza Vaccine (1) 09/01/2023      Medicare Screening Tests and Risk Assessments:     Lula Marcos is here for his Subsequent Wellness visit. Last Medicare Wellness visit information reviewed, patient interviewed and updates made to the record today. Health Risk Assessment:   Patient rates overall health as very good. Patient feels that their physical health rating is slightly worse. Patient is dissatisfied with their life. Eyesight was rated as same. Hearing was rated as slightly worse. Patient feels that their emotional and mental health rating is slightly worse. Patients states they are never, rarely angry. Patient states they are often unusually tired/fatigued.  Pain experienced in the last 7 days has been none. Patient states that he has experienced no weight loss or gain in last 6 months. Depression Screening:   PHQ-2 Score: 2      Fall Risk Screening: In the past year, patient has experienced: no history of falling in past year      Home Safety:  Patient does not have trouble with stairs inside or outside of their home. Patient has working smoke alarms and has working carbon monoxide detector. Home safety hazards include: none. Nutrition:   Current diet is No Added Salt and Limited junk food. Medications:   Patient is currently taking over-the-counter supplements. OTC medications include: CoQ10. Patient is able to manage medications. Activities of Daily Living (ADLs)/Instrumental Activities of Daily Living (IADLs):   Walk and transfer into and out of bed and chair?: Yes  Dress and groom yourself?: Yes    Bathe or shower yourself?: Yes    Feed yourself? Yes  Do your laundry/housekeeping?: Yes  Manage your money, pay your bills and track your expenses?: Yes  Make your own meals?: Yes    Do your own shopping?: Yes    Durable Medical Equipment Suppliers  none    Previous Hospitalizations:   Any hospitalizations or ED visits within the last 12 months?: No      Advance Care Planning:   Living will: Yes    Durable POA for healthcare: Yes    Advanced directive: Yes      Comments: Medical POA is his wife.     Cognitive Screening:   Provider or family/friend/caregiver concerned regarding cognition?: No    PREVENTIVE SCREENINGS      Cardiovascular Screening:    General: Screening Not Indicated and History Lipid Disorder      Diabetes Screening:     General: Screening Current      Colorectal Cancer Screening:     General: Screening Current      Prostate Cancer Screening:    General: Screening Current      Osteoporosis Screening:    General: Screening Not Indicated      Abdominal Aortic Aneurysm (AAA) Screening:    Risk factors include: age between 70-77 yo General: Screening Not Indicated      Lung Cancer Screening:     General: Screening Not Indicated      Hepatitis C Screening:    General: Screening Current    Screening, Brief Intervention, and Referral to Treatment (SBIRT)    Screening  Typical number of drinks in a day: 3  Typical number of drinks in a week: 10  Interpretation: Low risk drinking behavior. AUDIT-C Screenin) How often did you have a drink containing alcohol in the past year? 2 to 3 times a week  2) How many drinks did you have on a typical day when you were drinking in the past year? 3 to 4  3) How often did you have 6 or more drinks on one occasion in the past year? monthly    AUDIT-C Score: 5  Interpretation: Score 4-12 (male): POSITIVE screen for alcohol misuse    AUDIT Screenin) How often during the last year have you found that you were not able to stop drinking once you had started? 0 - never  5) How often during the last year have you failed to do what was normally expected from you because of drinking? 0 - never  6) How often during the last year have you needed a first drink in the morning to get yourself going after a heavy drinking session? 0 - never  7) How often during the last year have you had a feeling of guilt or remorse after drinking?  1 - less than monthly  8) How often during the last year have you been unable to remember what happened the night before because you had been drinking? 0 - never  9) Have you or someone else been injured as a result of your drinking? 0 - no  10) Has a relative or friend or a doctor or another health worker been concerned about your drinking or suggested you cut down? 0 - no    AUDIT Score: 6  Interpretation: Low risk alcohol consumption    Single Item Drug Screening:  How often have you used an illegal drug (including marijuana) or a prescription medication for non-medical reasons in the past year? never    Single Item Drug Screen Score: 0  Interpretation: Negative screen for possible drug use disorder    Brief Intervention  Alcohol & drug use screenings were reviewed. No concerns regarding substance use disorder identified. Healthy alcohol use/limits discussed. Limit 1 drink per evening and no more than 3drinks per occasion    Time Spent  Time spent screening/evaluating the patient for alcohol misuse: 1 minutes. Other Counseling Topics:   Car/seat belt/driving safety, skin self-exam, sunscreen and regular weightbearing exercise and calcium and vitamin D intake. Immunizations discussed. Tdap today. Recommend updated COVID vaccine and yearly influenza vaccine. No results found. Physical Exam:     /80   Pulse 59   Temp 98 °F (36.7 °C)   Wt 74.4 kg (164 lb)   SpO2 98%   BMI 23.53 kg/m²     Physical Exam  Vitals reviewed. Constitutional:       Appearance: He is normal weight. HENT:      Head: Normocephalic. Right Ear: Tympanic membrane and ear canal normal.      Left Ear: Tympanic membrane and ear canal normal.      Nose: Nose normal.      Mouth/Throat:      Mouth: Mucous membranes are moist.   Eyes:      Extraocular Movements: Extraocular movements intact. Conjunctiva/sclera: Conjunctivae normal.      Pupils: Pupils are equal, round, and reactive to light. Neck:      Thyroid: No thyromegaly or thyroid tenderness. Vascular: No carotid bruit. Cardiovascular:      Rate and Rhythm: Normal rate and regular rhythm. Pulses: Normal pulses. Heart sounds: Normal heart sounds. Pulmonary:      Effort: Pulmonary effort is normal. No respiratory distress. Breath sounds: Normal breath sounds. No wheezing. Abdominal:      General: Abdomen is flat. Bowel sounds are normal. There is no distension. Palpations: Abdomen is soft. Tenderness: There is no abdominal tenderness. Musculoskeletal:      Cervical back: Neck supple. No tenderness. Right lower leg: No edema. Left lower leg: No edema.    Lymphadenopathy:      Cervical: No cervical adenopathy. Skin:     Coloration: Skin is not pale. Neurological:      General: No focal deficit present. Mental Status: He is alert and oriented to person, place, and time. Psychiatric:         Mood and Affect: Mood normal.          Recent Results (from the past 504 hour(s))   Lipid panel    Collection Time: 07/18/23  8:53 AM   Result Value Ref Range    Cholesterol 247 (H) See Comment mg/dL    Triglycerides 51 See Comment mg/dL    HDL, Direct 93 >=40 mg/dL    LDL Calculated 144 (H) 0 - 100 mg/dL    Non-HDL-Chol (CHOL-HDL) 154 mg/dl   Comprehensive metabolic panel    Collection Time: 07/18/23  8:53 AM   Result Value Ref Range    Sodium 136 135 - 147 mmol/L    Potassium 4.9 3.5 - 5.3 mmol/L    Chloride 106 96 - 108 mmol/L    CO2 30 21 - 32 mmol/L    ANION GAP 0 mmol/L    BUN 20 5 - 25 mg/dL    Creatinine 1.04 0.60 - 1.30 mg/dL    Glucose, Fasting 93 65 - 99 mg/dL    Calcium 9.2 8.3 - 10.1 mg/dL    AST 29 5 - 45 U/L    ALT 36 12 - 78 U/L    Alkaline Phosphatase 45 (L) 46 - 116 U/L    Total Protein 7.4 6.4 - 8.4 g/dL    Albumin 3.8 3.5 - 5.0 g/dL    Total Bilirubin 1.27 (H) 0.20 - 1.00 mg/dL    eGFR 74 ml/min/1.73sq m     Echo complete w/ contrast if indicated  •  Left Ventricle: Left ventricular cavity size is normal. Wall thickness   is normal. The left ventricular ejection fraction is 60% by visual   estimation. Systolic function is normal. Wall motion is normal. Diastolic   function is normal.  •  Left Atrium: The atrium is moderately dilated (42-48 mL/m2). •  Right Atrium: The atrium is mildly dilated. •  Aorta: The aortic root is mildly dilated but may be overestimated due   to eccentric measurement. Recommend CT chest for further evaluaiton.         The 10-year ASCVD risk score (Milad LLAMAS, et al., 2019) is: 13%    Values used to calculate the score:      Age: 79 years      Sex: Male      Is Non- : No      Diabetic: No      Tobacco smoker: No      Systolic Blood Pressure: 128 mmHg      Is BP treated: Yes      HDL Cholesterol: 93 mg/dL      Total Cholesterol: 247 mg/dL      Reyna Gross DO

## 2023-07-28 NOTE — PATIENT INSTRUCTIONS
Medicare Preventive Visit Patient Instructions  Thank you for completing your Welcome to Medicare Visit or Medicare Annual Wellness Visit today. Your next wellness visit will be due in one year (7/28/2024). The screening/preventive services that you may require over the next 5-10 years are detailed below. Some tests may not apply to you based off risk factors and/or age. Screening tests ordered at today's visit but not completed yet may show as past due. Also, please note that scanned in results may not display below. Preventive Screenings:  Service Recommendations Previous Testing/Comments   Colorectal Cancer Screening  · Colonoscopy    · Fecal Occult Blood Test (FOBT)/Fecal Immunochemical Test (FIT)  · Fecal DNA/Cologuard Test  · Flexible Sigmoidoscopy Age: 43-73 years old   Colonoscopy: every 10 years (May be performed more frequently if at higher risk)  OR  FOBT/FIT: every 1 year  OR  Cologuard: every 3 years  OR  Sigmoidoscopy: every 5 years  Screening may be recommended earlier than age 39 if at higher risk for colorectal cancer. Also, an individualized decision between you and your healthcare provider will decide whether screening between the ages of 77-80 would be appropriate.  Colonoscopy: 12/05/2019  FOBT/FIT: Not on file  Cologuard: Not on file  Sigmoidoscopy: Not on file    Screening Current     Prostate Cancer Screening Individualized decision between patient and health care provider in men between ages of 53-66   Medicare will cover every 12 months beginning on the day after your 50th birthday PSA: 0.5 ng/mL     Screening Current     Hepatitis C Screening Once for adults born between 1945 and 1965  More frequently in patients at high risk for Hepatitis C Hep C Antibody: 10/11/2021    Screening Current   Diabetes Screening 1-2 times per year if you're at risk for diabetes or have pre-diabetes Fasting glucose: 93 mg/dL (7/18/2023)  A1C: No results in last 5 years (No results in last 5 years)  Screening Current   Cholesterol Screening Once every 5 years if you don't have a lipid disorder. May order more often based on risk factors. Lipid panel: 07/18/2023  Screening Not Indicated  History Lipid Disorder      Other Preventive Screenings Covered by Medicare:  1. Abdominal Aortic Aneurysm (AAA) Screening: covered once if your at risk. You're considered to be at risk if you have a family history of AAA or a male between the age of 70-76 who smoking at least 100 cigarettes in your lifetime. 2. Lung Cancer Screening: covers low dose CT scan once per year if you meet all of the following conditions: (1) Age 48-67; (2) No signs or symptoms of lung cancer; (3) Current smoker or have quit smoking within the last 15 years; (4) You have a tobacco smoking history of at least 20 pack years (packs per day x number of years you smoked); (5) You get a written order from a healthcare provider. 3. Glaucoma Screening: covered annually if you're considered high risk: (1) You have diabetes OR (2) Family history of glaucoma OR (3)  aged 48 and older OR (3)  American aged 72 and older  3. Osteoporosis Screening: covered every 2 years if you meet one of the following conditions: (1) Have a vertebral abnormality; (2) On glucocorticoid therapy for more than 3 months; (3) Have primary hyperparathyroidism; (4) On osteoporosis medications and need to assess response to drug therapy. 5. HIV Screening: covered annually if you're between the age of 14-79. Also covered annually if you are younger than 13 and older than 72 with risk factors for HIV infection. For pregnant patients, it is covered up to 3 times per pregnancy.     Immunizations:  Immunization Recommendations   Influenza Vaccine Annual influenza vaccination during flu season is recommended for all persons aged >= 6 months who do not have contraindications   Pneumococcal Vaccine   * Pneumococcal conjugate vaccine = PCV13 (Prevnar 13), PCV15 (Vaxneuvance), PCV20 (Prevnar 20)  * Pneumococcal polysaccharide vaccine = PPSV23 (Pneumovax) Adults 2364 years old: 1-3 doses may be recommended based on certain risk factors  Adults 72 years old: 1-2 doses may be recommended based off what pneumonia vaccine you previously received   Hepatitis B Vaccine 3 dose series if at intermediate or high risk (ex: diabetes, end stage renal disease, liver disease)   Tetanus (Td) Vaccine - COST NOT COVERED BY MEDICARE PART B Following completion of primary series, a booster dose should be given every 10 years to maintain immunity against tetanus. Td may also be given as tetanus wound prophylaxis. Tdap Vaccine - COST NOT COVERED BY MEDICARE PART B Recommended at least once for all adults. For pregnant patients, recommended with each pregnancy. Shingles Vaccine (Shingrix) - COST NOT COVERED BY MEDICARE PART B  2 shot series recommended in those aged 48 and above     Health Maintenance Due:      Topic Date Due   • Colorectal Cancer Screening  12/05/2022   • Hepatitis C Screening  Completed     Immunizations Due:      Topic Date Due   • COVID-19 Vaccine (5 - Pfizer series) 02/19/2023   • Influenza Vaccine (1) 09/01/2023     Advance Directives   What are advance directives? Advance directives are legal documents that state your wishes and plans for medical care. These plans are made ahead of time in case you lose your ability to make decisions for yourself. Advance directives can apply to any medical decision, such as the treatments you want, and if you want to donate organs. What are the types of advance directives? There are many types of advance directives, and each state has rules about how to use them. You may choose a combination of any of the following:  · Living will: This is a written record of the treatment you want. You can also choose which treatments you do not want, which to limit, and which to stop at a certain time.  This includes surgery, medicine, IV fluid, and tube feedings. · Durable power of  for healthcare [facility]): This is a written record that states who you want to make healthcare choices for you when you are unable to make them for yourself. This person, called a proxy, is usually a family member or a friend. You may choose more than 1 proxy. · Do not resuscitate (DNR) order:  A DNR order is used in case your heart stops beating or you stop breathing. It is a request not to have certain forms of treatment, such as CPR. A DNR order may be included in other types of advance directives. · Medical directive: This covers the care that you want if you are in a coma, near death, or unable to make decisions for yourself. You can list the treatments you want for each condition. Treatment may include pain medicine, surgery, blood transfusions, dialysis, IV or tube feedings, and a ventilator (breathing machine). · Values history: This document has questions about your views, beliefs, and how you feel and think about life. This information can help others choose the care that you would choose. Why are advance directives important? An advance directive helps you control your care. Although spoken wishes may be used, it is better to have your wishes written down. Spoken wishes can be misunderstood, or not followed. Treatments may be given even if you do not want them. An advance directive may make it easier for your family to make difficult choices about your care. Alcohol Use and Your Health    Drinking too much can harm your health. Excessive alcohol use leads to about 88,000 death in the [facility] each year, and shortens the life of those who diet by almost 30 years. Further, excessive drinking cost the economy $249 billion in 2010. Most excessive drinkers are not alcohol dependent. Excessive alcohol use has immediate effects that increase the risk of many harmful health conditions. These are most often the result of binge drinking. Over time, excessive alcohol use can lead to the development of chronic diseases and other series health problems. What is considered a "drink"? Excessive alcohol use includes:  · Binge Drinking: For women, 4 or more drinks consumed on one occasion. For men, 5 or more drinks consumed on one occasion. · Heavy Drinking: For women, 8 or more drinks per week. For men, 15 or more drinks per week  · Any alcohol used by pregnant women  · Any alcohol used by those under the age of 21 years    If you choose to drink, do so in moderation:  · Do not drink at all if you are under the age of 24, or if you are or may be pregnant, or have health problems that could be made worse by drinking.   · For women, up to 1 drink per day  · For men, up to 2 drinks a day    No one should begin drinking or drink more frequently based on potential health benefits    Short-Term Health Risks:  · Injuries: motor vehicle crashes, falls, drownings, burns  · Violence: homicide, suicide, sexual assault, intimate partner violence  · Alcohol poisoning  · Reproductive health: risky sexual behaviors, unintended prengnacy, sexually transmitted diseases, miscarriage, stillbirth, fetal alcohol syndrome    Long-Term Health Risks:  · Chronic diseases: high blood pressure, heart disease, stroke, liver disease, digestive problems  · Cancers: breast, mouth and throat, liver, colon  · Learning and memory problems: dementia, poor school performance  · Mental health: depression, anxiety, insomnia  · Social problems: lost productivity, family problems, unemployment  · Alcohol dependence    For support and more information:  · Substance Abuse and [address]  Web Address: https://[employer]/    · Alcoholics Anonymous        Web Address: http://www.hitchcock.info/    https://www.cdc.gov/alcohol/fact-sheets/alcohol-use.htm     © Copyright [employer] 2018 Information is for End User's use only and may not be sold, redistributed or otherwise used for commercial purposes.  All illustrations and images included in CareNotes® are the copyrighted property of A.D.A.M., Inc. or [address] [place] [address]

## 2023-08-01 ENCOUNTER — TELEPHONE (OUTPATIENT)
Dept: SLEEP CENTER | Facility: CLINIC | Age: 68
End: 2023-08-01

## 2023-08-01 NOTE — TELEPHONE ENCOUNTER
----- Message from Tamica Oglesby MD sent at 8/1/2023 10:55 AM EDT -----  approved  ----- Message -----  From: George Tobar  Sent: 8/1/2023   8:24 AM EDT  To: Sleep Medicine [facility] Provider    This Home sleep study needs approval.     If approved please sign and return to clerical pool. If denied please include reasons why. Also provide alternative testing if warranted. Please sign and return to clerical pool.

## 2023-08-01 NOTE — TELEPHONE ENCOUNTER
----- Message from Sue Kang MD sent at 8/1/2023 10:55 AM EDT -----  approved  ----- Message -----  From: Loree Farooq  Sent: 8/1/2023   8:24 AM EDT  To: Sleep Medicine [facility] Provider    This Home sleep study needs approval.     If approved please sign and return to clerical pool. If denied please include reasons why. Also provide alternative testing if warranted. Please sign and return to clerical pool.

## 2023-08-02 ENCOUNTER — PREP FOR PROCEDURE (OUTPATIENT)
Age: 68
End: 2023-08-02

## 2023-08-02 ENCOUNTER — TELEPHONE (OUTPATIENT)
Age: 68
End: 2023-08-02

## 2023-08-02 DIAGNOSIS — Z86.010 HISTORY OF COLON POLYPS: Primary | ICD-10-CM

## 2023-08-02 NOTE — TELEPHONE ENCOUNTER
Scheduled date of colonoscopy (as of today):9/22/23    Physician performing colonoscopy:Dr Lavonne Hook    Location of colonoscopy: [facility]    Bowel prep reviewed with patient:miralax/dulcolax    Instructions reviewed with patient by:prep instructions sent via i[employer]ore    Clearances: N/A

## 2023-08-02 NOTE — TELEPHONE ENCOUNTER
[address] [facility],[address] Assessment    Name: Jose Floyd  YOB: 1955  Last Height: 5' 10" (1.778 m)  Last weight: 74.4 kg (164 lb)  BMI: 23.53 kg/m²  Procedure: colonoscopy  Diagnosis: history of polyps  Date of procedure: 9/22/23  Prep: miralax/dulcolax  Responsible : ? Phone#: ?  Name completing form: Michael Carrion  Date form completed: 08/02/23      If the patient answers yes to any of these questions, schedule in a hospital  Are you pregnant: No  Do you rely on a wheelchair for mobility: No  Have you been diagnosed with End Stage Renal Disease (ESRD): No  Do you need oxygen during the day: No  Have you had a heart attack or stroke within the past three months: No  Have you had a seizure within the past three months: No  Have you ever been informed by anesthesia that you have a difficult airway: No  Additional Questions  Have you had any cardiac testing or are under the care of a Cardiologist (see cardiac list): No  Cardiac list:   Do you have an implanted cardiac defibrillator: No (Comment:  This patient should be scheduled in the hospital)    Have any bleeding problems, such as anemia or hemophilia (If patient has H&H result below 8, schedule in hospital.  H&H must be within 30 days of procedure): No    Had an organ transplant within the past 3 months: No    Do you have any present infections: No  Do you get short of breath when walking a few blocks: No  Have you been diagnosed with diabetes: No  Comments (provide cardiac provider information if applicable):

## 2023-08-08 ENCOUNTER — HOSPITAL ENCOUNTER (OUTPATIENT)
Dept: SLEEP CENTER | Facility: CLINIC | Age: 68
Discharge: HOME | End: 2023-08-08
Payer: MEDICARE

## 2023-08-08 DIAGNOSIS — R06.83 SNORING: ICD-10-CM

## 2023-08-08 DIAGNOSIS — I51.7 LAE (LEFT ATRIAL ENLARGEMENT): ICD-10-CM

## 2023-08-08 DIAGNOSIS — I10 PRIMARY HYPERTENSION: ICD-10-CM

## 2023-08-08 DIAGNOSIS — R29.818 SUSPECTED SLEEP APNEA: ICD-10-CM

## 2023-08-08 DIAGNOSIS — G47.19 EXCESSIVE DAYTIME SLEEPINESS: ICD-10-CM

## 2023-08-08 PROCEDURE — G0399 HOME SLEEP TEST/TYPE 3 PORTA: HCPCS

## 2023-08-10 NOTE — PROGRESS NOTES
Home Sleep Study Documentation    HOME STUDY DEVICE: Noxturnal no                                           Ana G3 yes      Pre-Sleep Home Study:    [facility] yes    Return demonstration performed by Patient: yes    Written instructions provided to Patient: yes    Patient signed consent form: yes        Post-Sleep Home Study:    Additional comments by Patient: none    Home Sleep Study Failed:no:    Failure reason: N/A    Reported or Detected: N/A    Scored by:  OSWALDO MastersGT

## 2023-08-14 DIAGNOSIS — I10 HYPERTENSION, UNSPECIFIED TYPE: ICD-10-CM

## 2023-08-15 RX ORDER — AMLODIPINE BESYLATE 5 MG/1
TABLET ORAL
Qty: 180 TABLET | Refills: 0 | Status: SHIPPED | OUTPATIENT
Start: 2023-08-15

## 2023-08-17 ENCOUNTER — TELEPHONE (OUTPATIENT)
Dept: SLEEP CENTER | Facility: CLINIC | Age: 68
End: 2023-08-17

## 2023-08-17 NOTE — TELEPHONE ENCOUNTER
Sleep study resulted and shows moderate ONEYDA and severe hypoxia. Per study order, Dr. Cote Keep requests follow up with sleep specialist.    Needs to schedule consult.

## 2023-09-22 ENCOUNTER — ANESTHESIA EVENT (OUTPATIENT)
Dept: GASTROENTEROLOGY | Facility: AMBULARY SURGERY CENTER | Age: 68
End: 2023-09-22

## 2023-09-22 ENCOUNTER — HOSPITAL ENCOUNTER (OUTPATIENT)
Dept: GASTROENTEROLOGY | Facility: AMBULARY SURGERY CENTER | Age: 68
Setting detail: OUTPATIENT SURGERY
End: 2023-09-22
Attending: INTERNAL MEDICINE
Payer: MEDICARE

## 2023-09-22 ENCOUNTER — ANESTHESIA (OUTPATIENT)
Dept: GASTROENTEROLOGY | Facility: AMBULARY SURGERY CENTER | Age: 68
End: 2023-09-22

## 2023-09-22 VITALS
WEIGHT: 162 LBS | HEIGHT: 70 IN | OXYGEN SATURATION: 99 % | DIASTOLIC BLOOD PRESSURE: 68 MMHG | HEART RATE: 52 BPM | RESPIRATION RATE: 16 BRPM | TEMPERATURE: 96.9 F | BODY MASS INDEX: 23.19 KG/M2 | SYSTOLIC BLOOD PRESSURE: 121 MMHG

## 2023-09-22 DIAGNOSIS — Z86.010 HISTORY OF COLON POLYPS: ICD-10-CM

## 2023-09-22 RX ORDER — SODIUM CHLORIDE, SODIUM LACTATE, POTASSIUM CHLORIDE, CALCIUM CHLORIDE 600; 310; 30; 20 MG/100ML; MG/100ML; MG/100ML; MG/100ML
INJECTION, SOLUTION INTRAVENOUS CONTINUOUS PRN
Status: DISCONTINUED | OUTPATIENT
Start: 2023-09-22 | End: 2023-09-22

## 2023-09-22 RX ORDER — PROPOFOL 10 MG/ML
INJECTION, EMULSION INTRAVENOUS AS NEEDED
Status: DISCONTINUED | OUTPATIENT
Start: 2023-09-22 | End: 2023-09-22

## 2023-09-22 RX ORDER — LIDOCAINE HYDROCHLORIDE 10 MG/ML
INJECTION, SOLUTION EPIDURAL; INFILTRATION; INTRACAUDAL; PERINEURAL AS NEEDED
Status: DISCONTINUED | OUTPATIENT
Start: 2023-09-22 | End: 2023-09-22

## 2023-09-22 RX ADMIN — PROPOFOL 100 MG: 10 INJECTION, EMULSION INTRAVENOUS at 09:11

## 2023-09-22 RX ADMIN — PROPOFOL 20 MG: 10 INJECTION, EMULSION INTRAVENOUS at 09:17

## 2023-09-22 RX ADMIN — LIDOCAINE HYDROCHLORIDE 50 MG: 10 INJECTION, SOLUTION EPIDURAL; INFILTRATION; INTRACAUDAL at 09:11

## 2023-09-22 RX ADMIN — Medication 40 MG: at 09:14

## 2023-09-22 RX ADMIN — SODIUM CHLORIDE, SODIUM LACTATE, POTASSIUM CHLORIDE, AND CALCIUM CHLORIDE: .6; .31; .03; .02 INJECTION, SOLUTION INTRAVENOUS at 08:39

## 2023-09-22 RX ADMIN — PROPOFOL 50 MG: 10 INJECTION, EMULSION INTRAVENOUS at 09:13

## 2023-09-22 NOTE — ANESTHESIA PREPROCEDURE EVALUATION
Procedure:  COLONOSCOPY    Relevant Problems   CARDIO   (+) Hypertension   (+) Mixed hyperlipidemia   (+) Sinus bradycardia      PULMONARY   (+) ONEYDA (obstructive sleep apnea)        Physical Exam    Airway    Mallampati score: II  TM Distance: >3 FB  Neck ROM: full     Dental   No notable dental hx     Cardiovascular  Rhythm: regular, Rate: normal,     Pulmonary  Breath sounds clear to auscultation,     Other Findings        Anesthesia Plan  ASA Score- 2     Anesthesia Type- IV sedation with anesthesia with ASA Monitors. Additional Monitors:   Airway Plan:           Plan Factors-Exercise tolerance (METS): >4 METS. Chart reviewed. Existing labs reviewed. Patient summary reviewed. Patient is not a current smoker. Induction- intravenous. Postoperative Plan-     Informed Consent- Anesthetic plan and risks discussed with patient. I personally reviewed this patient with the CRNA. Discussed and agreed on the Anesthesia Plan with the CRNA. Octavio Gamboa

## 2023-09-22 NOTE — H&P
History and Physical -  [facility]  Ivana Porter 79 y.o. male MRN: [id number]        HPI: 53-year-old male with history of colon polyps. Regular bowel movements. 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   • Squamous cell skin cancer     left forearm     Past Surgical History:   Procedure Laterality Date   • COLONOSCOPY     • EYE SURGERY Bilateral 2019    prebyopia correction   • HERNIA REPAIR Bilateral     inguinal   • MO INCISION & REMOVAL FOREIGN BODY SUBQ TISS SIMPLE Left 9/15/2022    Procedure: REMOVAL FOREIGN BODY EXTREMITY - left thumb 20 mm x 7 mm x 2 mm;  Surgeon: Heydi Rey MD;  Location:  MAIN OR;  Service: Orthopedics     Social History   Social History     Substance and Sexual Activity   Alcohol Use Yes   • Alcohol/week: 10.0 standard drinks of alcohol   • Types: 10 Cans of beer per week    Comment: 10 beer a week     Social History     Substance and Sexual Activity   Drug Use No     Social History     Tobacco Use   Smoking Status Never   Smokeless Tobacco Never     Family History   Problem Relation Age of Onset   • Cancer Mother         Colon Cancer   • Stroke Father         Passed away at 80   • Hypertension Father    • Hypertension Brother        Meds/Allergies     (Not in a hospital admission)      No Known Allergies    Objective     Blood pressure 125/76, pulse 63, temperature (!) 97.1 °F (36.2 °C), temperature source Temporal, resp. rate 18, height 5' 10" (1.778 m), weight 73.5 kg (162 lb), SpO2 98 %.     PHYSICAL EXAM:    Gen: NAD  CV: S1 & S2 normal, RRR  CHEST: Clear to auscultate  ABD: soft, NT/ND, good bowel sounds  EXT: no edema    ASSESSMENT:     History of colon polyps    PLAN:    Colonoscopy

## 2023-09-22 NOTE — ANESTHESIA POSTPROCEDURE EVALUATION
Post-Op Assessment Note    CV Status:  Stable  Pain Score: 0    Pain management: adequate     Mental Status:  Arousable   Hydration Status:  Stable   PONV Controlled:  None   Airway Patency:  Patent      Post Op Vitals Reviewed: Yes      Staff: CRNA         No notable events documented.     BP 94/58 (09/22/23 0924)    Temp (!) 96.9 °F (36.1 °C) (09/22/23 0924)    Pulse (!) 50 (09/22/23 0924)   Resp 18 (09/22/23 0924)    SpO2 97 % (09/22/23 0924)

## 2023-11-10 DIAGNOSIS — I10 HYPERTENSION, UNSPECIFIED TYPE: ICD-10-CM

## 2023-11-10 RX ORDER — AMLODIPINE BESYLATE 5 MG/1
TABLET ORAL
Qty: 180 TABLET | Refills: 0 | Status: SHIPPED | OUTPATIENT
Start: 2023-11-10

## 2023-11-28 ENCOUNTER — OFFICE VISIT (OUTPATIENT)
Dept: SLEEP CENTER | Facility: CLINIC | Age: 68
End: 2023-11-28
Payer: MEDICARE

## 2023-11-28 VITALS
RESPIRATION RATE: 18 BRPM | BODY MASS INDEX: 24.05 KG/M2 | HEIGHT: 70 IN | SYSTOLIC BLOOD PRESSURE: 130 MMHG | WEIGHT: 168 LBS | DIASTOLIC BLOOD PRESSURE: 72 MMHG | OXYGEN SATURATION: 98 % | HEART RATE: 64 BPM

## 2023-11-28 DIAGNOSIS — R29.818 SUSPECTED SLEEP APNEA: ICD-10-CM

## 2023-11-28 DIAGNOSIS — G47.19 EXCESSIVE DAYTIME SLEEPINESS: ICD-10-CM

## 2023-11-28 DIAGNOSIS — I10 PRIMARY HYPERTENSION: ICD-10-CM

## 2023-11-28 DIAGNOSIS — G47.33 OSA (OBSTRUCTIVE SLEEP APNEA): Primary | ICD-10-CM

## 2023-11-28 DIAGNOSIS — I51.7 LAE (LEFT ATRIAL ENLARGEMENT): ICD-10-CM

## 2023-11-28 DIAGNOSIS — G47.34 SLEEP RELATED HYPOXIA: ICD-10-CM

## 2023-11-28 PROCEDURE — 99204 OFFICE O/P NEW MOD 45 MIN: CPT | Performed by: INTERNAL MEDICINE

## 2023-11-28 NOTE — PROGRESS NOTES
Consultation - Iam Mary  79 y.o. male  :1955  DCF:[id number]  DOS:2023    Physician Requesting Consult: Ayleen Sanders DO             Reason for Consult : At your kind request I saw Sommer Montoya for initial sleep evaluation today. Home sleep testing was undertaken to evaluate for sleep disordered breathing and patient is here to review results and further options. The study demonstrated a respiratory event index (LEONORA) of 19.6. The lowest SpO2 recorded is 77% and 40.8 minutes during the study was spent with saturations below 90%. The snore index was 53.9%. patient's presents with:    PFSH, Problem List, Medications & Allergies were reviewed in EMR. Davion Melo  has a past medical history of Elevated BP without diagnosis of hypertension (2021), Groin lump (2021), HL (hearing loss) (), Hypertension ( - ), Hypocalcemia (2020), Pneumonia, Right asymmetrical SNHL (2019), Squamous cell carcinoma in situ (SCCIS) of skin, and Squamous cell skin cancer. He has a current medication list which includes the following prescription(s): amlodipine, diazepam, meclizine hcl, and rosuvastatin. HPI: His study was undertaken because of his cardiac comorbidities. Also, wife reports loud snoring of several years duration Davion Melo is aware of snoring but not breathing difficulties during sleep. Other complaints: Fatigue irrespective of sleep. Restless Leg Syndrome: reports no suggestive symptoms. Parasomnia: no features reported    Sleep Routine (averaged): Typical Bedtime: 9-10 PM.  Gets OOB: 5-6 AM. TIB:>7 hrs. Sleep latency:< 15 minutes Sleep Interruptions:  1-2, need to use bathroom able to fall back asleep. Awakens: Wake on own, without alarm spontaneously  Upon awakening: Usually usually feels refreshed. He estimates getting 8 hrs sleep. Daytime Function:Pelon reports daytime sleepiness may doze off when sedentary at home in the evening. He rated himself at Total score: 7 /24 on the [place] Sleepiness Scale. Habits:   reports that he has never smoked. He has never used smokeless tobacco.;  reports current alcohol use of about 10.0 standard drinks of alcohol per week.; Reports no history of drug use.;  E-Cigarette/Vaping Never User; Caffeine use:limited; Exercise routine: regular. Occupation: Retired   Family History: Negative for sleep disturbance. ROS: Significant for weight has been stable within a few pounds. He reported no nasal, respiratory or cardiac symptoms. Dennys Ramos EXAM:  /72   Pulse 64   Resp 18   Ht 5' 10" (1.778 m)   Wt 76.2 kg (168 lb)   SpO2 98%   BMI 24.11 kg/m²    General: Well groomed male, well appearing, in no apparent distress. Neurological: Alert and orientated; cooperative; Cranial nerves intact;    Psychiatric: Speech: Clear and coherent; normal mood, affect & thought   Skin: Warm and dry; Color& Hydration good; no facial rashes or lesions   HEENT:  Craniofacial anatomy: normal Sinuses: Non-tender. TMJ: Normal    Eyes: EOM's intact; conjunctiva/corneas clear   Ears: Appear normal     Nasal Airway: is patent Septum: Intact; Turbinates: Normal; Rhinorrhea: None  Mouth: Lips: Normal posture; Dentition: normal . Mucosa: Moist; Hard Palate:normal    Oropharryx: crowded and AP narrowing Tongue: Mallampati:Class IV and MobileSoft Palate:  redundant  Tonsils: absent  Neck:; Neck Circumference: 13.5 "; supple; no abnormal masses; Thyroid: Normal. Trachea: Central.    Lymph: No cervical or submandibular Lymhadenopathy  Heart: S1,S2 normal; RRR; no gallop; no murmur   Lungs: Respiratory Effort: Normal. Air entry good bilaterally. No wheezes. No rales  Abdomen: Obese, soft & non-tender    Extremities: No pedal edema. No clubbing or cyanosis. Musculoskeletal:  Motor normal; Gait: Normal.       IMPRESSION: Primary/Secondary Sleep Diagnoses (to Medical or Psych conditions) & Comorbidities   1.  ONEYDA (obstructive sleep apnea)  CPAP Study      2. Sleep related hypoxia  CPAP Study      3. Excessive daytime sleepiness        4. Primary hypertension        5. LAE (left atrial enlargement)        6. Suspected sleep apnea             PLAN:   1. I reviewed results of the Sleep study with the patient. 2. With respect to above conditions, I counseled on pathophysiology, diagnosis, treatment options, risks and benefits; inter-relationship and effects on symptoms and comorbidities; risks of no treatment; costs and insurance aspects. 3. Patient elected positive airway pressure therapy but somewhat reluctantly and is to be scheduled for a titration study. 4. I also advised on weight reduction. 5. Follow-up to be scheduled after the study to review results and to initiate therapy. Sincerely,      Authenticated electronically on 11/53/97   Board Certified Specialist     Portions of the record may have been created with voice recognition software. Occasional wrong word or "sound a like" substitutions may have occurred due to the inherent limitations of voice recognition software. There may also be notations and random deletions of words or characters from malfunctioning software. Read the chart carefully and recognize, using context, where substitutions/deletions have occurred.

## 2023-11-28 NOTE — PATIENT INSTRUCTIONS
What is ONEYDA? Obstructive sleep apnea is a common and serious sleep disorder that causes you to stop breathing during sleep. The airway repeatedly becomes blocked, limiting the amount of air that reaches your lungs. When this happens, you may snore loudly or making choking noises as you try to breathe. Your brain and body becomes oxygen deprived and you may wake up. This may happen a few times a night, or in more severe cases, several hundred times a night. Sleep apnea can make you wake up in the morning feeling tired or unrefreshed even though you have had a full night of sleep. During the day, you may feel fatigued, have difficulty concentrating or you may even unintentionally fall asleep. This is because your body is waking up numerous times throughout the night, even though you might not be conscious of each awakening. The lack of oxygen your body receives can have negative long-term consequences for your health. This includes:  High blood pressure  Heart disease  Irregular heart rhythms  Stroke  Pre-diabetes and diabetes  Depression  Testing  An objective evaluation of your sleep may be needed before your board certified sleep physician can make a diagnosis. Options include:   In-lab overnight sleep study  This type of sleep study requires you to stay overnight at a sleep center, in a bed that may resemble a hotel room. You will sleep with sensors hooked up to various parts of your body. These sensors record your brain waves, heartbeat, breathing and movement. An overnight sleep study also provides your doctor with the most complete information about your sleep. Learn more about an overnight sleep study. Home sleep apnea test  Some patients with high risk factors for obstructive sleep apnea and no other medical disorders may be candidates for a home sleep apnea test. The testing equipment differs in that it is less complicated than what is used in an overnight sleep study.  As such, does not give all the data an in-lab will and if negative, may not mean you do not have the problem. Treatment for sleep apnea includes using a continuous positive airway pressure (CPAP) machine to keep your airway open during sleep. A mask is placed over your nose and mouth, or just your nose. The mask is hooked to the CPAP machine that blows a gentle stream of air into the mask when you breathe. This helps keep your airway open so you can breathe more regularly. Extra oxygen may be given to you through the machine. You may be given a mouth device. It looks like a mouth guard or dental retainer and stops your tongue and mouth tissues from blocking your throat while you sleep. Surgery may be needed to remove extra tissues that block your mouth, throat, or nose. Manage sleep apnea:   Do not smoke. Nicotine and other chemicals in cigarettes and cigars can cause lung damage. Ask your healthcare provider for information if you currently smoke and need help to quit. E-cigarettes or smokeless tobacco still contain nicotine. Talk to your healthcare provider before you use these products. Do not drink alcohol or take sedative medicine before you go to sleep. Alcohol and sedatives can relax the muscles and tissues around your throat. This can block the airflow to your lungs. Maintain a healthy weight. Excess tissue around your throat may restrict your breathing. Ask your healthcare provider for information if you need to lose weight. Sleep on your side or use pillows designed to prevent sleep apnea. This prevents your tongue or other tissues from blocking your throat. You can also raise the head of your bed. Driving Safety. Refrain from driving when drowsy. Follow up with your healthcare provider as directed: Write down your questions so you remember to ask them during your visits. Go to AASM website for more information: Sleepeducation. org  What is ONEYDA?    Obstructive sleep apnea is a common and serious sleep disorder that causes you to stop breathing during sleep. The airway repeatedly becomes blocked, limiting the amount of air that reaches your lungs. When this happens, you may snore loudly or making choking noises as you try to breathe. Your brain and body becomes oxygen deprived and you may wake up. This may happen a few times a night, or in more severe cases, several hundred times a night. Sleep apnea can make you wake up in the morning feeling tired or unrefreshed even though you have had a full night of sleep. During the day, you may feel fatigued, have difficulty concentrating or you may even unintentionally fall asleep. This is because your body is waking up numerous times throughout the night, even though you might not be conscious of each awakening. The lack of oxygen your body receives can have negative long-term consequences for your health. This includes:  High blood pressure  Heart disease  Irregular heart rhythms  Stroke  Pre-diabetes and diabetes  Depression  Testing  An objective evaluation of your sleep may be needed before your board certified sleep physician can make a diagnosis. Options include:   In-lab overnight sleep study  This type of sleep study requires you to stay overnight at a sleep center, in a bed that may resemble a hotel room. You will sleep with sensors hooked up to various parts of your body. These sensors record your brain waves, heartbeat, breathing and movement. An overnight sleep study also provides your doctor with the most complete information about your sleep. Learn more about an overnight sleep study. Home sleep apnea test  Some patients with high risk factors for obstructive sleep apnea and no other medical disorders may be candidates for a home sleep apnea test. The testing equipment differs in that it is less complicated than what is used in an overnight sleep study. As such, does not give all the data an in-lab will and if negative, may not mean you do not have the problem.   Treatment for sleep apnea includes using a continuous positive airway pressure (CPAP) machine to keep your airway open during sleep. A mask is placed over your nose and mouth, or just your nose. The mask is hooked to the CPAP machine that blows a gentle stream of air into the mask when you breathe. This helps keep your airway open so you can breathe more regularly. Extra oxygen may be given to you through the machine. You may be given a mouth device. It looks like a mouth guard or dental retainer and stops your tongue and mouth tissues from blocking your throat while you sleep. Surgery may be needed to remove extra tissues that block your mouth, throat, or nose. Manage sleep apnea:   Do not smoke. Nicotine and other chemicals in cigarettes and cigars can cause lung damage. Ask your healthcare provider for information if you currently smoke and need help to quit. E-cigarettes or smokeless tobacco still contain nicotine. Talk to your healthcare provider before you use these products. Do not drink alcohol or take sedative medicine before you go to sleep. Alcohol and sedatives can relax the muscles and tissues around your throat. This can block the airflow to your lungs. Maintain a healthy weight. Excess tissue around your throat may restrict your breathing. Ask your healthcare provider for information if you need to lose weight. Sleep on your side or use pillows designed to prevent sleep apnea. This prevents your tongue or other tissues from blocking your throat. You can also raise the head of your bed. Driving Safety. Refrain from driving when drowsy. Follow up with your healthcare provider as directed: Write down your questions so you remember to ask them during your visits. Go to AASM website for more information: Sleepeducation. org    Nursing Support:  When: Monday through Friday 7A-5PM except holidays  Where: Our direct line is [phone]. If you are having a true emergency please call 911.   In the event that the line is busy or it is after hours please leave a voice message and we will return your call. Please speak clearly, leaving your full name, birth date, best number to reach you and the reason for your call. Medication refills: We will need the name of the medication, the dosage, the ordering provider, whether you get a 30 or 90 day refill, and the pharmacy name and address. Medications will be ordered by the provider only. Nurses cannot call in prescriptions. Please allow 7 days for medication refills. Physician requested updates: If your provider requested that you call with an update after starting medication, please be ready to provide us the medication and dosage, what time you take your medication, the time you attempt to fall asleep, time you fall asleep, when you wake up, and what time you get out of bed. Sleep Study Results: We will contact you with sleep study results and/or next steps after the physician has reviewed your testing.

## 2023-11-29 ENCOUNTER — HOSPITAL ENCOUNTER (OUTPATIENT)
Dept: SLEEP CENTER | Facility: CLINIC | Age: 68
Discharge: HOME | End: 2023-11-29
Payer: MEDICARE

## 2023-11-29 DIAGNOSIS — G47.33 OSA (OBSTRUCTIVE SLEEP APNEA): ICD-10-CM

## 2023-11-29 DIAGNOSIS — G47.34 SLEEP RELATED HYPOXIA: ICD-10-CM

## 2023-11-29 PROCEDURE — 95811 POLYSOM 6/>YRS CPAP 4/> PARM: CPT

## 2023-11-30 DIAGNOSIS — G47.33 OSA (OBSTRUCTIVE SLEEP APNEA): Primary | ICD-10-CM

## 2023-11-30 NOTE — PROGRESS NOTES
Sleep Study Documentation    Pre-Sleep Study       Sleep testing procedure explained to patient:YES    Patient napped prior to study:NO    Caffeine:Dayshift worker after 12PM.  Caffeine use:NO    Alcohol:Dayshift workers after 5PM: Alcohol use:NO    Typical day for patient:YES       Study Documentation    Sleep Study Indication ONEYDA- diagnosed home study s:     Sleep Study: Treatment   Optimal PAP pressure: 11  Leak:None  Snore:Eliminated  REM Obtained:yes  Supplemental O2: NO  Minimum SaO2 89%  Baseline SaO2 96.2%  PAP mask tried (list all) Medium ResMed AirFit N30 nasal   PAP mask choice (final)Medium ResMed AirFit N30 nasal   PAP mask type:nasal  PAP pressure at which snoring was eliminated 11  Minimum SaO2 at final PAP pressure 93%  Mode of Therapy:CPAP  ETCO2:No  CPAP changed to BiPAP:No    Mode of Therapy:CPAP    EKG abnormalities: no     EEG abnormalities: no    Were abnormal behaviors in sleep observed:NO    Is Total Sleep Study Recording Time < 2 hours: N/A    Is Total Sleep Study Recording Time > 2 hours but study is incomplete: N/A    Is Total Sleep Study Recording Time 6 hours or more but sleep was not obtained: NO    Patient classification: retired       Post-Sleep Study    Medication used at bedtime or during sleep study:NO    Patient reports time it took to fall asleep:20 to 30 minutes    Patient reports waking up during study:3 or more times. Patient reports returning to sleep without difficulty. Patient reports sleeping 6 to 8 hours without dreaming. Does the Patient feel this is a typical night of sleep:worse than usual    Patient rated sleepiness: Not sleepy or tired    PAP treatment:yes: Post PAP treatment patient reports feeling unchanged and would wear PAP mask at home.

## 2023-12-08 ENCOUNTER — TELEPHONE (OUTPATIENT)
Dept: SLEEP CENTER | Facility: CLINIC | Age: 68
End: 2023-12-08

## 2023-12-08 DIAGNOSIS — E78.2 MIXED HYPERLIPIDEMIA: ICD-10-CM

## 2023-12-08 RX ORDER — ROSUVASTATIN CALCIUM 10 MG/1
TABLET, COATED ORAL
Qty: 90 TABLET | Refills: 1 | Status: SHIPPED | OUTPATIENT
Start: 2023-12-08

## 2023-12-08 NOTE — TELEPHONE ENCOUNTER
Patient with moderate ONEYDA completed CPAP study and Dr. Leticia Noriega ordered CPAP    Patient needs DME set up and compliance appointments   Left call back message and a MyChart message

## 2023-12-12 LAB

## 2023-12-12 NOTE — TELEPHONE ENCOUNTER
Returned call from patient and advised of sleep study results. Scheduled DME set up 12/27/23 in [facility]. Rx for CPAP and clinical information sent to Juwan Batista via [employer]. Scheduled compliance follow up 4/24/24.   Added to wait list.

## 2023-12-27 LAB

## 2024-02-11 ENCOUNTER — RA CDI HCC (OUTPATIENT)
Dept: OTHER | Facility: HOSPITAL | Age: 69
End: 2024-02-11

## 2024-02-17 DIAGNOSIS — I10 HYPERTENSION, UNSPECIFIED TYPE: ICD-10-CM

## 2024-02-19 RX ORDER — AMLODIPINE BESYLATE 5 MG/1
TABLET ORAL
Qty: 180 TABLET | Refills: 0 | Status: SHIPPED | OUTPATIENT
Start: 2024-02-19

## 2024-03-06 ENCOUNTER — VBI (OUTPATIENT)
Dept: ADMINISTRATIVE | Facility: OTHER | Age: 69
End: 2024-03-06

## 2024-03-06 NOTE — TELEPHONE ENCOUNTER
03/06/24 11:01 AM    Patient contacted (left message) to bring Advance Directive, POLST, or Living Will document to next scheduled pcp visit.    Thank you.  Earlene Louise [facility]

## 2024-03-08 ENCOUNTER — APPOINTMENT (OUTPATIENT)
Dept: LAB | Facility: CLINIC | Age: 69
End: 2024-03-08
Payer: MEDICARE

## 2024-03-08 DIAGNOSIS — I10 PRIMARY HYPERTENSION: ICD-10-CM

## 2024-03-08 DIAGNOSIS — E78.2 MIXED HYPERLIPIDEMIA: ICD-10-CM

## 2024-03-08 DIAGNOSIS — Z12.5 SCREENING FOR PROSTATE CANCER: ICD-10-CM

## 2024-03-08 LAB
ALBUMIN SERPL BCP-MCNC: 4.1 G/DL (ref 3.5–5)
ALP SERPL-CCNC: 42 U/L (ref 34–104)
ALT SERPL W P-5'-P-CCNC: 26 U/L (ref 7–52)
ANION GAP SERPL CALCULATED.3IONS-SCNC: 4 MMOL/L
AST SERPL W P-5'-P-CCNC: 24 U/L (ref 13–39)
BACTERIA UR QL AUTO: ABNORMAL /HPF
BILIRUB SERPL-MCNC: 0.73 MG/DL (ref 0.2–1)
BILIRUB UR QL STRIP: NEGATIVE
BUN SERPL-MCNC: 20 MG/DL (ref 5–25)
CALCIUM SERPL-MCNC: 9.2 MG/DL (ref 8.4–10.2)
CAOX CRY URNS QL MICRO: ABNORMAL /HPF
CHLORIDE SERPL-SCNC: 105 MMOL/L (ref 96–108)
CHOLEST SERPL-MCNC: 192 MG/DL
CLARITY UR: CLEAR
CO2 SERPL-SCNC: 30 MMOL/L (ref 21–32)
COLOR UR: ABNORMAL
CREAT SERPL-MCNC: 0.9 MG/DL (ref 0.6–1.3)
ERYTHROCYTE [DISTWIDTH] IN BLOOD BY AUTOMATED COUNT: 14.3 % (ref 11.6–15.1)
GFR SERPL CREATININE-BSD FRML MDRD: 88 ML/MIN/1.73SQ M
GLUCOSE P FAST SERPL-MCNC: 104 MG/DL (ref 65–99)
GLUCOSE UR STRIP-MCNC: NEGATIVE MG/DL
HCT VFR BLD AUTO: 45.4 % (ref 36.5–49.3)
HDLC SERPL-MCNC: 90 MG/DL
HGB BLD-MCNC: 14.3 G/DL (ref 12–17)
HGB UR QL STRIP.AUTO: NEGATIVE
KETONES UR STRIP-MCNC: NEGATIVE MG/DL
LDLC SERPL CALC-MCNC: 87 MG/DL (ref 0–100)
LEUKOCYTE ESTERASE UR QL STRIP: NEGATIVE
MCH RBC QN AUTO: 28.5 PG (ref 26.8–34.3)
MCHC RBC AUTO-ENTMCNC: 31.5 G/DL (ref 31.4–37.4)
MCV RBC AUTO: 91 FL (ref 82–98)
NITRITE UR QL STRIP: NEGATIVE
NON-SQ EPI CELLS URNS QL MICRO: ABNORMAL /HPF
NONHDLC SERPL-MCNC: 102 MG/DL
PH UR STRIP.AUTO: 6 [PH]
PLATELET # BLD AUTO: 283 THOUSANDS/UL (ref 149–390)
PMV BLD AUTO: 11.2 FL (ref 8.9–12.7)
POTASSIUM SERPL-SCNC: 4.9 MMOL/L (ref 3.5–5.3)
PROT SERPL-MCNC: 6.6 G/DL (ref 6.4–8.4)
PROT UR STRIP-MCNC: ABNORMAL MG/DL
PSA SERPL-MCNC: 0.47 NG/ML (ref 0–4)
RBC # BLD AUTO: 5.01 MILLION/UL (ref 3.88–5.62)
RBC #/AREA URNS AUTO: ABNORMAL /HPF
SODIUM SERPL-SCNC: 139 MMOL/L (ref 135–147)
SP GR UR STRIP.AUTO: 1.02 (ref 1–1.03)
TRIGL SERPL-MCNC: 75 MG/DL
TSH SERPL DL<=0.05 MIU/L-ACNC: 2.18 UIU/ML (ref 0.45–4.5)
UROBILINOGEN UR STRIP-ACNC: <2 MG/DL
WBC # BLD AUTO: 6.1 THOUSAND/UL (ref 4.31–10.16)
WBC #/AREA URNS AUTO: ABNORMAL /HPF

## 2024-03-08 PROCEDURE — 36415 COLL VENOUS BLD VENIPUNCTURE: CPT

## 2024-03-08 PROCEDURE — 81001 URINALYSIS AUTO W/SCOPE: CPT

## 2024-03-08 PROCEDURE — 80053 COMPREHEN METABOLIC PANEL: CPT

## 2024-03-08 PROCEDURE — 84443 ASSAY THYROID STIM HORMONE: CPT

## 2024-03-08 PROCEDURE — 85027 COMPLETE CBC AUTOMATED: CPT

## 2024-03-08 PROCEDURE — 80061 LIPID PANEL: CPT

## 2024-03-08 PROCEDURE — G0103 PSA SCREENING: HCPCS

## 2024-03-13 ENCOUNTER — OFFICE VISIT (OUTPATIENT)
Dept: INTERNAL MEDICINE CLINIC | Facility: CLINIC | Age: 69
End: 2024-03-13
Payer: MEDICARE

## 2024-03-13 VITALS
RESPIRATION RATE: 14 BRPM | SYSTOLIC BLOOD PRESSURE: 132 MMHG | DIASTOLIC BLOOD PRESSURE: 80 MMHG | BODY MASS INDEX: 24.62 KG/M2 | HEIGHT: 70 IN | OXYGEN SATURATION: 99 % | WEIGHT: 172 LBS | TEMPERATURE: 96.3 F | HEART RATE: 55 BPM

## 2024-03-13 DIAGNOSIS — H81.01 MENIERE'S DISEASE OF RIGHT EAR: ICD-10-CM

## 2024-03-13 DIAGNOSIS — R73.01 IFG (IMPAIRED FASTING GLUCOSE): ICD-10-CM

## 2024-03-13 DIAGNOSIS — G47.33 OSA (OBSTRUCTIVE SLEEP APNEA): Primary | ICD-10-CM

## 2024-03-13 DIAGNOSIS — I10 HYPERTENSION, UNSPECIFIED TYPE: ICD-10-CM

## 2024-03-13 DIAGNOSIS — E78.2 MIXED HYPERLIPIDEMIA: ICD-10-CM

## 2024-03-13 PROCEDURE — 99214 OFFICE O/P EST MOD 30 MIN: CPT | Performed by: INTERNAL MEDICINE

## 2024-03-13 PROCEDURE — G2211 COMPLEX E/M VISIT ADD ON: HCPCS | Performed by: INTERNAL MEDICINE

## 2024-03-13 NOTE — ASSESSMENT & PLAN NOTE
-complained of snoring and noted LAE on echo  -moderate in severity  -he is compliant with CPAP at 11cm

## 2024-03-13 NOTE — ASSESSMENT & PLAN NOTE
-has R SNHL, pulsatile tinnitus and vertigo, episodic  -meclizine prn.  He stopped coffee  -no significant improvement with valium or betahistine

## 2024-03-13 NOTE — ASSESSMENT & PLAN NOTE
-improved  -continue to take rosuvastatin 10mg daily as tolerated  -adhere to low fat, low cholesterol diet

## 2024-03-13 NOTE — PROGRESS NOTES
Assessment/Plan:    Problem List Items Addressed This Visit     Mixed hyperlipidemia     -improved  -continue to take rosuvastatin 10mg daily as tolerated  -adhere to low fat, low cholesterol diet         Relevant Orders    Lipid panel    Hypertension     -follow low salt diet  -continue amlodipine 5mg daily with additional prn if BP >140/90         Relevant Orders    Comprehensive metabolic panel    Protein / creatinine ratio, urine    ONEYDA (obstructive sleep apnea) - Primary     -complained of snoring and noted LAE on echo  -moderate in severity  -he is compliant with CPAP at 11cm         Meniere's disease of right ear     -has R SNHL, pulsatile tinnitus and vertigo, episodic  -meclizine prn.  He stopped coffee  -no significant improvement with valium or betahistine        Other Visit Diagnoses     IFG (impaired fasting glucose)        -adhere to low carb diet  -monitor    Relevant Orders    Comprehensive metabolic panel          Subjective:      Patient ID: Pelon Fletcher is a 68 y.o. male.    HPI  He is traveling frequently and plans to see his son in [place].    In the interim, he has been diagnosed with moderate ONEYDA, using CPAP at 11cm.  Energy is ok.      Had decreased exercise performance on rosuvastatin but since he is not training for anything he is taking rosuvastatin 10mg daily.      Home BP around 126/73.  Infrequently needs additional prn dose of amlodipine.  Denies LE edema.    Had vertigo episode 9/2023, seen by ENT.  Tried on betahistine which helped initially but was not as effective.  He has stopped caffeine.  Did not find link with alcohol intake and his tinnitus so he has continued.    The following portions of the patient's history were reviewed and updated as appropriate: allergies, current medications, past family history, past medical history, past social history, past surgical history and problem list.      Current Outpatient Medications:   •  amLODIPine (NORVASC) 5 mg tablet, TAKE 1 TAB IN THE  "MORNING, MAY TAKE ADDITIONAL 1 TAB IF BP >140/90, Disp: 180 tablet, Rfl: 0  •  Meclizine HCl (ANTIVERT PO), Take by mouth, Disp: , Rfl:   •  rosuvastatin (CRESTOR) 10 MG tablet, TAKE 1 TABLET BY MOUTH EVERY DAY, Disp: 90 tablet, Rfl: 1    Review of Systems   Constitutional:  Positive for activity change and unexpected weight change. Negative for appetite change.   HENT:  Positive for hearing loss and tinnitus.    Respiratory:  Negative for cough, chest tightness, shortness of breath and wheezing.    Cardiovascular:  Negative for chest pain, palpitations and leg swelling.   Neurological:  Positive for dizziness. Negative for headaches.         Objective:    /80 (BP Location: Left arm, Patient Position: Sitting, Cuff Size: Standard)   Pulse 55   Temp (!) 96.3 °F (35.7 °C) (Tympanic Core)   Resp 14   Ht 5' 10\" (1.778 m)   Wt 78 kg (172 lb)   SpO2 99%   BMI 24.68 kg/m²      Physical Exam  Vitals reviewed.   Constitutional:       General: He is not in acute distress.     Appearance: He is normal weight.   Cardiovascular:      Rate and Rhythm: Normal rate and regular rhythm.      Pulses: Normal pulses.      Heart sounds: Normal heart sounds.   Pulmonary:      Effort: Pulmonary effort is normal. No respiratory distress.      Breath sounds: Normal breath sounds. No wheezing.   Musculoskeletal:      Right lower leg: No edema.      Left lower leg: No edema.   Skin:     General: Skin is dry.   Neurological:      Mental Status: He is alert and oriented to person, place, and time.   Psychiatric:         Mood and Affect: Mood normal.           Recent Results (from the past 168 hour(s))   PSA, Total Screen    Collection Time: 03/08/24  7:51 AM   Result Value Ref Range    PSA 0.47 0.00 - 4.00 ng/mL   Lipid panel    Collection Time: 03/08/24  7:51 AM   Result Value Ref Range    Cholesterol 192 See Comment mg/dL    Triglycerides 75 See Comment mg/dL    HDL, Direct 90 >=40 mg/dL    LDL Calculated 87 0 - 100 mg/dL    " Non-HDL-Chol (CHOL-HDL) 102 mg/dl   Comprehensive metabolic panel    Collection Time: 03/08/24  7:51 AM   Result Value Ref Range    Sodium 139 135 - 147 mmol/L    Potassium 4.9 3.5 - 5.3 mmol/L    Chloride 105 96 - 108 mmol/L    CO2 30 21 - 32 mmol/L    ANION GAP 4 mmol/L    BUN 20 5 - 25 mg/dL    Creatinine 0.90 0.60 - 1.30 mg/dL    Glucose, Fasting 104 (H) 65 - 99 mg/dL    Calcium 9.2 8.4 - 10.2 mg/dL    AST 24 13 - 39 U/L    ALT 26 7 - 52 U/L    Alkaline Phosphatase 42 34 - 104 U/L    Total Protein 6.6 6.4 - 8.4 g/dL    Albumin 4.1 3.5 - 5.0 g/dL    Total Bilirubin 0.73 0.20 - 1.00 mg/dL    eGFR 88 ml/min/1.73sq m   TSH, 3rd generation with Free T4 reflex    Collection Time: 03/08/24  7:51 AM   Result Value Ref Range    TSH 3RD GENERATON 2.176 0.450 - 4.500 uIU/mL   CBC    Collection Time: 03/08/24  7:51 AM   Result Value Ref Range    WBC 6.10 4.31 - 10.16 Thousand/uL    RBC 5.01 3.88 - 5.62 Million/uL    Hemoglobin 14.3 12.0 - 17.0 g/dL    Hematocrit 45.4 36.5 - 49.3 %    MCV 91 82 - 98 fL    MCH 28.5 26.8 - 34.3 pg    MCHC 31.5 31.4 - 37.4 g/dL    RDW 14.3 11.6 - 15.1 %    Platelets 283 149 - 390 Thousands/uL    MPV 11.2 8.9 - 12.7 fL   UA (URINE) with reflex to Scope    Collection Time: 03/08/24  7:51 AM   Result Value Ref Range    Color, UA Light Yellow     Clarity, UA Clear     Specific Gravity, UA 1.021 1.003 - 1.030    pH, UA 6.0 4.5, 5.0, 5.5, 6.0, 6.5, 7.0, 7.5, 8.0    Leukocytes, UA Negative Negative    Nitrite, UA Negative Negative    Protein, UA Trace (A) Negative mg/dl    Glucose, UA Negative Negative mg/dl    Ketones, UA Negative Negative mg/dl    Urobilinogen, UA <2.0 <2.0 mg/dl mg/dl    Bilirubin, UA Negative Negative    Occult Blood, UA Negative Negative   Urine Microscopic    Collection Time: 03/08/24  7:51 AM   Result Value Ref Range    RBC, UA None Seen None Seen, 1-2 /hpf    WBC, UA None Seen None Seen, 1-2 /hpf    Epithelial Cells None Seen None Seen, Occasional /hpf    Bacteria, UA None  Seen None Seen, Occasional /hpf    Ca Oxalate Xi, UA Occasional (A) None Seen /hpf

## 2024-04-24 ENCOUNTER — OFFICE VISIT (OUTPATIENT)
Dept: SLEEP CENTER | Facility: CLINIC | Age: 69
End: 2024-04-24
Payer: MEDICARE

## 2024-04-24 VITALS
WEIGHT: 170 LBS | HEIGHT: 70 IN | SYSTOLIC BLOOD PRESSURE: 120 MMHG | DIASTOLIC BLOOD PRESSURE: 80 MMHG | BODY MASS INDEX: 24.34 KG/M2

## 2024-04-24 DIAGNOSIS — I10 PRIMARY HYPERTENSION: ICD-10-CM

## 2024-04-24 DIAGNOSIS — G47.33 OSA (OBSTRUCTIVE SLEEP APNEA): Primary | ICD-10-CM

## 2024-04-24 DIAGNOSIS — G47.19 EXCESSIVE DAYTIME SLEEPINESS: ICD-10-CM

## 2024-04-24 DIAGNOSIS — G47.34 SLEEP RELATED HYPOXIA: ICD-10-CM

## 2024-04-24 DIAGNOSIS — I51.7 LAE (LEFT ATRIAL ENLARGEMENT): ICD-10-CM

## 2024-04-24 PROCEDURE — 99214 OFFICE O/P EST MOD 30 MIN: CPT | Performed by: INTERNAL MEDICINE

## 2024-04-24 PROCEDURE — G2211 COMPLEX E/M VISIT ADD ON: HCPCS | Performed by: INTERNAL MEDICINE

## 2024-04-24 NOTE — PROGRESS NOTES
Follow-Up Note - Sleep Center   Pelon Fletcher  68 y.o. male  :1955  MRN:[id number]  DOS:2024    CC: I saw this patient for follow-up in clinic today for Sleep disordered breathing, Coexisting Sleep and Medical Problems.. Interval changes: Treatment was initiated using a ResMed machine.  A sleep study to titrate Positive airway pressure (PAP) therapy was undertaken. Patient is here to review results and to adjust therapy.    The study demonstrated sleep disordered breathing was successfully remediated with PAP at 11 cm H2O. he had difficulty maintaining sleep and sleep efficiency was reduced at 66%.    HST in 2023 demonstrated a respiratory event index (LEONORA) of 19.6.  The lowest SpO2 recorded is 77% and 40.8 minutes during the study was spent with saturations below 90%.  The snore index was 53.9%.    PFSH, Problem List, Medications & Allergies were reviewed in EMR.   He  has a past medical history of Elevated BP without diagnosis of hypertension (2021), Groin lump (2021), HL (hearing loss) (), Hypertension ( - ), Hypocalcemia (2020), Pneumonia, Right asymmetrical SNHL (2019), Squamous cell carcinoma in situ (SCCIS) of skin, and Squamous cell skin cancer.    He has a current medication list which includes the following prescription(s): amlodipine, meclizine hcl, and rosuvastatin.    PHYSIOLOGICAL DATA REVIEW : Using PAP > 4 hours/night 90%. Estimated LEONORA 1.3/hour with pressure of 11cm H2O@90th/95th percentile; patient has not been using non FDA approved devices to sanitize the machine.  INTERPRETATION: Compliance is excellent; Pressure setting is:optimal; ;   SUBJECTIVE: With respect to use of PAP, Pelon  is experiencing some adverse effects:dry mouth/throat and mask leaks .He feels he derives no benefit subjectively.  Is satisfied with sleep and daytime function as he has been prior to using CPAP.   Sleep Routine: Pelon reports getting 8 hrs sleep; he has no  "difficulty initiating, but reports diffculty maintaining sleep . He arises spontaneously and feels refreshed and no different from when not using CPAP.Pelon denies daytime sleepiness,.  He rated himself at Total score: 5 /24 on the [place] Sleepiness Scale.   Other issues: None reported.     Habits: Reports that he has never smoked. He has never used smokeless tobacco.,  Reports current alcohol use of about 10.0 standard drinks of alcohol per week.,  Reports no history of drug use., Caffeine use:none; Exercise routine: regular.      ROS: Significant for weight is stable within a few pounds..  He is reporting no nasal, respiratory or cardiac symptoms.    EXAM: /80   Ht 5' 10\" (1.778 m)   Wt 77.1 kg (170 lb)   BMI 24.39 kg/m²     Wt Readings from Last 3 Encounters:   04/24/24 77.1 kg (170 lb)   03/21/24 78 kg (172 lb)   03/13/24 78 kg (172 lb)      Patient is well groomed; well appearing.   CNS: Alert, orientated, speech clear & coherent  Psych: cooperative and in no distress. Mental state: Appears normal.  H&N: EOMI; NC/AT: No facial pressure marks, no rashes.    Skin/Extrem: col & hydration normal; no edema  Resp: Respiratory effort is normal  Physical findings otherwise essentially unchanged from previous.    IMPRESSION: Problem List Items & Comorbidities Addressed this Visit    1. ONEYDA (obstructive sleep apnea)  PAP DME Pressure Change    PAP DME Resupply/Reorder      2. Sleep related hypoxia        3. Excessive daytime sleepiness        4. Primary hypertension        5. LAE (left atrial enlargement)            PLAN:  I reviewed results of prior studies and physiologic data with the patient.   I discussed treatment options with risks and benefits.  He declined mandibular advancement device and is not interested in surgery  Treatment with  PAP is medically necessary and Pelon is agreable to continue use.   Care of equipment, methods to improve comfort using PAP and importance of compliance with therapy were " "discussed.  Pressure setting: Change 10- 11 cmH2O.    Rx provided to replace supplies and Care coordinated with DME provider.   Discussed strategies for weight reduction.    Follow-up is advised in 1 year or sooner if needed to monitor progress, compliance and to adjust therapy.     Thank you for allowing me to participate in the care of this patient.    Sincerely,     Authenticated electronically on 04/24/24   Board Certified Specialist     Portions of the record may have been created with voice recognition software. Occasional wrong word or \"sound a like\" substitutions may have occurred due to the inherent limitations of voice recognition software. There may also be notations and random deletions of words or characters from malfunctioning software. Read the chart carefully and recognize, using context, where substitutions/deletions have occurred.    "

## 2024-04-25 ENCOUNTER — TELEPHONE (OUTPATIENT)
Dept: SLEEP CENTER | Facility: CLINIC | Age: 69
End: 2024-04-25

## 2024-04-26 LAB
DME PARACHUTE DELIVERY DATE ACTUAL: NORMAL
DME PARACHUTE DELIVERY DATE REQUESTED: NORMAL
DME PARACHUTE ITEM DESCRIPTION: NORMAL
DME PARACHUTE ORDER STATUS: NORMAL
DME PARACHUTE SUPPLIER NAME: NORMAL
DME PARACHUTE SUPPLIER PHONE: NORMAL

## 2024-05-09 DIAGNOSIS — E78.2 MIXED HYPERLIPIDEMIA: ICD-10-CM

## 2024-05-09 RX ORDER — ROSUVASTATIN CALCIUM 10 MG/1
10 TABLET, COATED ORAL DAILY
Qty: 90 TABLET | Refills: 1 | Status: SHIPPED | OUTPATIENT
Start: 2024-05-09

## 2024-06-06 ENCOUNTER — TELEPHONE (OUTPATIENT)
Dept: SLEEP CENTER | Facility: CLINIC | Age: 69
End: 2024-06-06

## 2024-06-06 ENCOUNTER — PATIENT MESSAGE (OUTPATIENT)
Dept: SLEEP CENTER | Facility: CLINIC | Age: 69
End: 2024-06-06

## 2024-06-06 NOTE — TELEPHONE ENCOUNTER
Patient sent [employer] message:  I will be traveling on a backpacking trip in [place] for the period June 7th through to June 23rd, and will not be able to use my CPAP over this period.    Please can I ask you place this planned break on file, to cover medical insurance requirements.   Thanks  Pelon  -------------------------------------------------------    Replied to patient and advised him to contact both his insurance company as well as On license of [facility] about his travel and inability to use CPAP during this time.

## 2024-09-20 ENCOUNTER — APPOINTMENT (OUTPATIENT)
Dept: LAB | Facility: CLINIC | Age: 69
End: 2024-09-20
Payer: MEDICARE

## 2024-09-20 DIAGNOSIS — E78.2 MIXED HYPERLIPIDEMIA: ICD-10-CM

## 2024-09-20 DIAGNOSIS — I10 HYPERTENSION, UNSPECIFIED TYPE: ICD-10-CM

## 2024-09-20 DIAGNOSIS — R73.01 IFG (IMPAIRED FASTING GLUCOSE): ICD-10-CM

## 2024-09-20 LAB
ALBUMIN SERPL BCG-MCNC: 4.3 G/DL (ref 3.5–5)
ALP SERPL-CCNC: 53 U/L (ref 34–104)
ALT SERPL W P-5'-P-CCNC: 30 U/L (ref 7–52)
ANION GAP SERPL CALCULATED.3IONS-SCNC: 7 MMOL/L (ref 4–13)
AST SERPL W P-5'-P-CCNC: 30 U/L (ref 13–39)
BILIRUB SERPL-MCNC: 0.95 MG/DL (ref 0.2–1)
BUN SERPL-MCNC: 19 MG/DL (ref 5–25)
CALCIUM SERPL-MCNC: 9.4 MG/DL (ref 8.4–10.2)
CHLORIDE SERPL-SCNC: 103 MMOL/L (ref 96–108)
CHOLEST SERPL-MCNC: 192 MG/DL
CO2 SERPL-SCNC: 30 MMOL/L (ref 21–32)
CREAT SERPL-MCNC: 1.06 MG/DL (ref 0.6–1.3)
CREAT UR-MCNC: 127.4 MG/DL
GFR SERPL CREATININE-BSD FRML MDRD: 72 ML/MIN/1.73SQ M
GLUCOSE P FAST SERPL-MCNC: 102 MG/DL (ref 65–99)
HDLC SERPL-MCNC: 85 MG/DL
LDLC SERPL CALC-MCNC: 98 MG/DL (ref 0–100)
NONHDLC SERPL-MCNC: 107 MG/DL
POTASSIUM SERPL-SCNC: 5.5 MMOL/L (ref 3.5–5.3)
PROT SERPL-MCNC: 7.2 G/DL (ref 6.4–8.4)
PROT UR-MCNC: 4.7 MG/DL
PROT/CREAT UR: 0 MG/G{CREAT} (ref 0–0.1)
SODIUM SERPL-SCNC: 140 MMOL/L (ref 135–147)
TRIGL SERPL-MCNC: 43 MG/DL

## 2024-09-20 PROCEDURE — 80053 COMPREHEN METABOLIC PANEL: CPT

## 2024-09-20 PROCEDURE — 36415 COLL VENOUS BLD VENIPUNCTURE: CPT

## 2024-09-20 PROCEDURE — 82570 ASSAY OF URINE CREATININE: CPT

## 2024-09-20 PROCEDURE — 80061 LIPID PANEL: CPT

## 2024-09-20 PROCEDURE — 84156 ASSAY OF PROTEIN URINE: CPT

## 2024-09-24 ENCOUNTER — OFFICE VISIT (OUTPATIENT)
Age: 69
End: 2024-09-24
Payer: MEDICARE

## 2024-09-24 VITALS
SYSTOLIC BLOOD PRESSURE: 136 MMHG | WEIGHT: 170 LBS | HEIGHT: 70 IN | BODY MASS INDEX: 24.34 KG/M2 | RESPIRATION RATE: 16 BRPM | DIASTOLIC BLOOD PRESSURE: 84 MMHG | OXYGEN SATURATION: 99 % | HEART RATE: 54 BPM

## 2024-09-24 DIAGNOSIS — Z00.6 ENCOUNTER FOR EXAMINATION FOR NORMAL COMPARISON OR CONTROL IN CLINICAL RESEARCH PROGRAM: ICD-10-CM

## 2024-09-24 DIAGNOSIS — E87.5 HYPERKALEMIA: ICD-10-CM

## 2024-09-24 DIAGNOSIS — I10 HYPERTENSION, UNSPECIFIED TYPE: Primary | ICD-10-CM

## 2024-09-24 DIAGNOSIS — Z23 ENCOUNTER FOR IMMUNIZATION: ICD-10-CM

## 2024-09-24 DIAGNOSIS — G47.33 OSA (OBSTRUCTIVE SLEEP APNEA): ICD-10-CM

## 2024-09-24 DIAGNOSIS — E78.2 MIXED HYPERLIPIDEMIA: ICD-10-CM

## 2024-09-24 DIAGNOSIS — Z12.5 SCREENING FOR PROSTATE CANCER: ICD-10-CM

## 2024-09-24 DIAGNOSIS — Z23 NEED FOR COVID-19 VACCINE: ICD-10-CM

## 2024-09-24 DIAGNOSIS — H81.01 MENIERE'S DISEASE OF RIGHT EAR: ICD-10-CM

## 2024-09-24 DIAGNOSIS — Z00.00 MEDICARE ANNUAL WELLNESS VISIT, SUBSEQUENT: ICD-10-CM

## 2024-09-24 DIAGNOSIS — R73.01 IFG (IMPAIRED FASTING GLUCOSE): ICD-10-CM

## 2024-09-24 PROCEDURE — 91320 SARSCV2 VAC 30MCG TRS-SUC IM: CPT | Performed by: INTERNAL MEDICINE

## 2024-09-24 PROCEDURE — G0439 PPPS, SUBSEQ VISIT: HCPCS | Performed by: INTERNAL MEDICINE

## 2024-09-24 PROCEDURE — 90480 ADMN SARSCOV2 VAC 1/ONLY CMP: CPT | Performed by: INTERNAL MEDICINE

## 2024-09-24 PROCEDURE — 90662 IIV NO PRSV INCREASED AG IM: CPT | Performed by: INTERNAL MEDICINE

## 2024-09-24 PROCEDURE — 99214 OFFICE O/P EST MOD 30 MIN: CPT | Performed by: INTERNAL MEDICINE

## 2024-09-24 PROCEDURE — G0008 ADMIN INFLUENZA VIRUS VAC: HCPCS | Performed by: INTERNAL MEDICINE

## 2024-09-24 RX ORDER — BETAHISTINE HCL 100 %
16 POWDER (GRAM) MISCELLANEOUS 2 TIMES DAILY
COMMUNITY

## 2024-09-24 NOTE — ASSESSMENT & PLAN NOTE
-controlled  -continue amlodipine 5mg daily, has rarely needed additional dose for BP >140/90  -continue lifestyle modifications  Orders:    Comprehensive metabolic panel; Future    CBC; Future    TSH, 3rd generation with Free T4 reflex; Future

## 2024-09-24 NOTE — ASSESSMENT & PLAN NOTE
-moderate in severity  -less snoring and nocturia.  Has LAE noted on echo  -complies with CPAP but overall does not feel change in quality of life

## 2024-09-24 NOTE — ASSESSMENT & PLAN NOTE
-slight increase  -continue rosuvastatin 10mg daily  -repeat labs prior to next appt  Orders:    Lipid panel; Future    Comprehensive metabolic panel; Future

## 2024-09-24 NOTE — ASSESSMENT & PLAN NOTE
-has R SNHL  -restarted betahistine  -previously tried meclizine and valium  -follow up with ENT

## 2024-09-24 NOTE — PROGRESS NOTES
Ambulatory Visit  Name: Pelon Fletcher      : 1955      MRN: [id number]  Encounter Provider: Vivian Romero DO  Encounter Date: 2024   Encounter department: [facility]    Assessment & Plan  Hypertension, unspecified type  -controlled  -continue amlodipine 5mg daily, has rarely needed additional dose for BP >140/90  -continue lifestyle modifications  Orders:    Comprehensive metabolic panel; Future    CBC; Future    TSH, 3rd generation with Free T4 reflex; Future    ONEYDA (obstructive sleep apnea)  -moderate in severity  -less snoring and nocturia.  Has LAE noted on echo  -complies with CPAP but overall does not feel change in quality of life       Mixed hyperlipidemia  -slight increase  -continue rosuvastatin 10mg daily  -repeat labs prior to next appt  Orders:    Lipid panel; Future    Comprehensive metabolic panel; Future    Hyperkalemia  -mild, suspect pseudo  -repeat BMP  Orders:    Basic metabolic panel; Future    IFG (impaired fasting glucose)  -mild, persistent  -check A1c  Orders:    Hemoglobin A1C; Future    Meniere's disease of right ear  -has R SNHL  -restarted betahistine  -previously tried meclizine and valium  -follow up with ENT         Medicare annual wellness visit, subsequent         Encounter for immunization    Orders:    influenza vaccine, high-dose, PF 0.5 mL (Fluzone High Dose)    Need for COVID-19 vaccine    Orders:    COVID-19 Pfizer mRNA vaccine 12 yr and older (Comirnaty pre-filled syringe)    Screening for prostate cancer    Orders:    PSA, Total Screen; Future      Depression Screening and Follow-up Plan: Patient was screened for depression during today's encounter. They screened negative with a PHQ-2 score of 0.      Preventive health issues were discussed with patient, and age appropriate screening tests were ordered as noted in patient's After Visit Summary. Personalized health advice and appropriate referrals for health education or  preventive services given if needed, as noted in patient's After Visit Summary.    History of Present Illness       Had COVID 4/2024.  Did well overall.    Started betahistine last month    He does not like to use his CPAP but is resigned to using it for one year.   No difference with alertness or fatigue during the day.    He has not had to take extra amlodipine in past 1-2 years.       Patient Care Team:  Vivian Romero DO as PCP - General  DO Rai Rodriguez MD (Orthopedic Surgery)  Renato Kwok MD (Gastroenterology)    Review of Systems   Constitutional:  Negative for activity change, appetite change and unexpected weight change.   HENT:  Positive for hearing loss and tinnitus.    Eyes:  Negative for visual disturbance.   Respiratory:  Positive for apnea. Negative for shortness of breath and wheezing.    Cardiovascular:  Negative for chest pain, palpitations and leg swelling.   Gastrointestinal:  Negative for abdominal pain, constipation, diarrhea and nausea.   Genitourinary:         Minimal nocturia   Musculoskeletal:  Negative for arthralgias.   Neurological:  Positive for dizziness (infrequent). Negative for headaches.   Psychiatric/Behavioral:  Positive for sleep disturbance. Negative for dysphoric mood. The patient is not nervous/anxious.      Medical History Reviewed by provider this encounter:  Allergies  [facility]       Annual Wellness Visit Questionnaire   Pelon is here for his Subsequent Wellness visit. Last Medicare Wellness visit information reviewed, patient interviewed and updates made to the record today.      Health Risk Assessment:   Patient rates overall health as very good. Patient feels that their physical health rating is same. Patient is satisfied with their life. Eyesight was rated as same. Hearing was rated as slightly worse. Patient feels that their emotional and mental health rating is slightly better. Patients states they are never, rarely angry. Patient  states they are sometimes unusually tired/fatigued. Pain experienced in the last 7 days has been none. Patient states that he has experienced no weight loss or gain in last 6 months.     Depression Screening:   PHQ-2 Score: 0      Fall Risk Screening:   In the past year, patient has experienced: no history of falling in past year      Home Safety:  Patient does not have trouble with stairs inside or outside of their home. Patient has working smoke alarms and has working carbon monoxide detector. Home safety hazards include: none.     Nutrition:   Current diet is Low Saturated Fat, Low Carb, No Added Salt and Limited junk food.     Medications:   Patient is currently taking over-the-counter supplements. OTC medications include: see medication list. Patient is able to manage medications.     Activities of Daily Living (ADLs)/Instrumental Activities of Daily Living (IADLs):   Walk and transfer into and out of bed and chair?: Yes  Dress and groom yourself?: Yes    Bathe or shower yourself?: Yes    Feed yourself? Yes  Do your laundry/housekeeping?: Yes  Manage your money, pay your bills and track your expenses?: Yes  Make your own meals?: Yes    Do your own shopping?: Yes    Durable Medical Equipment Suppliers  [employer] (CPAP)    Previous Hospitalizations:   Any hospitalizations or ED visits within the last 12 months?: No      Advance Care Planning:   Living will: Yes    Durable POA for healthcare: Yes    Advanced directive: Yes      Cognitive Screening:   Provider or family/friend/caregiver concerned regarding cognition?: No    PREVENTIVE SCREENINGS      Cardiovascular Screening:    General: Screening Not Indicated and History Lipid Disorder      Diabetes Screening:     General: Screening Current      Colorectal Cancer Screening:     General: Screening Current      Prostate Cancer Screening:    General: Screening Current      Osteoporosis Screening:    General: Screening Not Indicated      Abdominal Aortic Aneurysm  (AAA) Screening:    Risk factors include: age between 65-74 yo        General: Screening Not Indicated      Lung Cancer Screening:     General: Screening Not Indicated      Hepatitis C Screening:    General: Screening Current    Screening, Brief Intervention, and Referral to Treatment (SBIRT)    Screening  Typical number of drinks in a day: 4  Typical number of drinks in a week: 10  Interpretation: Risky drinking behavior.    AUDIT-C Screenin) How often did you have a drink containing alcohol in the past year? 2 to 3 times a week  2) How many drinks did you have on a typical day when you were drinking in the past year? 3 to 4  3) How often did you have 6 or more drinks on one occasion in the past year? less than monthly    AUDIT-C Score: 4  Interpretation: Score 4-12 (male): POSITIVE screen for alcohol misuse    AUDIT Screenin) How often during the last year have you found that you were not able to stop drinking once you had started? 0 - never  5) How often during the last year have you failed to do what was normally expected from you because of drinking? 0 - never  6) How often during the last year have you needed a first drink in the morning to get yourself going after a heavy drinking session? 0 - never  7) How often during the last year have you had a feeling of guilt or remorse after drinking? 0 - never  8) How often during the last year have you been unable to remember what happened the night before because you had been drinking? 0 - never  9) Have you or someone else been injured as a result of your drinking? 0 - no  10) Has a relative or friend or a doctor or another health worker been concerned about your drinking or suggested you cut down? 0 - no    AUDIT Score: 4  Interpretation: Low risk alcohol consumption    Single Item Drug Screening:  How often have you used an illegal drug (including marijuana) or a prescription medication for non-medical reasons in the past year? never    Single Item Drug  "Screen Score: 0  Interpretation: Negative screen for possible drug use disorder    Brief Intervention  Alcohol & drug use screenings were reviewed. No concerns regarding substance use disorder identified. Healthy alcohol use/limits discussed.     Advised 1-2 drinks per evening and no more than 3 drinks per occasion    Time Spent  Time spent screening/evaluating the patient for alcohol misuse: 2 minutes.     Other Counseling Topics:   Car/seat belt/driving safety, skin self-exam, sunscreen and regular weightbearing exercise and calcium and vitamin D intake. Immunizations discussed.  Influenza and COVID due today.  Recommend RSV vaccine.    Social Determinants of Health     Financial Resource Strain: Low Risk  (7/28/2023)    Overall Financial Resource Strain (CARDIA)     Difficulty of Paying Living Expenses: Not hard at all   Food Insecurity: No Food Insecurity (9/24/2024)    Hunger Vital Sign     Worried About Running Out of Food in the Last Year: Never true     Ran Out of Food in the Last Year: Never true   Transportation Needs: No Transportation Needs (9/24/2024)    PRAPARE - Transportation     Lack of Transportation (Medical): No     Lack of Transportation (Non-Medical): No   Housing Stability: Low Risk  (9/24/2024)    Housing Stability Vital Sign     Unable to Pay for Housing in the Last Year: No     Number of Times Moved in the Last Year: 0     Homeless in the Last Year: No   Utilities: Not At Risk (9/24/2024)    [facility] Utilities     Threatened with loss of utilities: No     No results found.    Objective     /84 (BP Location: Left arm, Patient Position: Sitting, Cuff Size: Large)   Pulse (!) 54   Resp 16   Ht 5' 10\" (1.778 m)   Wt 77.1 kg (170 lb)   SpO2 99%   BMI 24.39 kg/m²     Physical Exam  Vitals reviewed.   Constitutional:       General: He is not in acute distress.     Appearance: He is normal weight.   HENT:      Head: Normocephalic.      Right Ear: Tympanic membrane and ear canal normal.      " Left Ear: Tympanic membrane and ear canal normal.      Nose: Nose normal.      Mouth/Throat:      Mouth: Mucous membranes are moist.   Eyes:      Extraocular Movements: Extraocular movements intact.      Conjunctiva/sclera: Conjunctivae normal.      Pupils: Pupils are equal, round, and reactive to light.   Neck:      Thyroid: No thyromegaly or thyroid tenderness.      Vascular: No carotid bruit.   Cardiovascular:      Rate and Rhythm: Regular rhythm. Bradycardia present.      Pulses: Normal pulses.      Heart sounds: Normal heart sounds.   Pulmonary:      Effort: Pulmonary effort is normal. No respiratory distress.      Breath sounds: Normal breath sounds. No wheezing.   Abdominal:      General: Bowel sounds are normal. There is no distension.      Palpations: Abdomen is soft.      Tenderness: There is no abdominal tenderness.   Musculoskeletal:      Cervical back: Neck supple. No tenderness.      Right lower leg: No edema.      Left lower leg: No edema.   Lymphadenopathy:      Cervical: No cervical adenopathy.   Skin:     Coloration: Skin is not pale.   Neurological:      General: No focal deficit present.      Mental Status: He is alert and oriented to person, place, and time.   Psychiatric:         Mood and Affect: Mood normal.         Recent Results (from the past 336 hour(s))   Comprehensive metabolic panel    Collection Time: 09/20/24  7:34 AM   Result Value Ref Range    Sodium 140 135 - 147 mmol/L    Potassium 5.5 (H) 3.5 - 5.3 mmol/L    Chloride 103 96 - 108 mmol/L    CO2 30 21 - 32 mmol/L    ANION GAP 7 4 - 13 mmol/L    BUN 19 5 - 25 mg/dL    Creatinine 1.06 0.60 - 1.30 mg/dL    Glucose, Fasting 102 (H) 65 - 99 mg/dL    Calcium 9.4 8.4 - 10.2 mg/dL    AST 30 13 - 39 U/L    ALT 30 7 - 52 U/L    Alkaline Phosphatase 53 34 - 104 U/L    Total Protein 7.2 6.4 - 8.4 g/dL    Albumin 4.3 3.5 - 5.0 g/dL    Total Bilirubin 0.95 0.20 - 1.00 mg/dL    eGFR 72 ml/min/1.73sq m   Lipid panel    Collection Time: 09/20/24   7:34 AM   Result Value Ref Range    Cholesterol 192 See Comment mg/dL    Triglycerides 43 See Comment mg/dL    HDL, Direct 85 >=40 mg/dL    LDL Calculated 98 0 - 100 mg/dL    Non-HDL-Chol (CHOL-HDL) 107 mg/dl   Protein / creatinine ratio, urine    Collection Time: 09/20/24  7:34 AM   Result Value Ref Range    Creatinine, Ur 127.4 Reference range not established. mg/dL    Protein Urine Random 4.7 Reference range not established. mg/dL    Prot/Creat Ratio, Ur 0.0 0.0 - 0.1

## 2024-09-28 ENCOUNTER — APPOINTMENT (OUTPATIENT)
Dept: LAB | Facility: CLINIC | Age: 69
End: 2024-09-28
Payer: MEDICARE

## 2024-09-28 DIAGNOSIS — Z00.6 ENCOUNTER FOR EXAMINATION FOR NORMAL COMPARISON OR CONTROL IN CLINICAL RESEARCH PROGRAM: ICD-10-CM

## 2024-09-28 DIAGNOSIS — E87.5 HYPERKALEMIA: ICD-10-CM

## 2024-09-28 DIAGNOSIS — R73.01 IFG (IMPAIRED FASTING GLUCOSE): ICD-10-CM

## 2024-09-28 LAB
ANION GAP SERPL CALCULATED.3IONS-SCNC: 9 MMOL/L (ref 4–13)
BUN SERPL-MCNC: 21 MG/DL (ref 5–25)
CALCIUM SERPL-MCNC: 9.1 MG/DL (ref 8.4–10.2)
CHLORIDE SERPL-SCNC: 104 MMOL/L (ref 96–108)
CO2 SERPL-SCNC: 29 MMOL/L (ref 21–32)
CREAT SERPL-MCNC: 0.96 MG/DL (ref 0.6–1.3)
EST. AVERAGE GLUCOSE BLD GHB EST-MCNC: 117 MG/DL
GFR SERPL CREATININE-BSD FRML MDRD: 81 ML/MIN/1.73SQ M
GLUCOSE P FAST SERPL-MCNC: 99 MG/DL (ref 65–99)
HBA1C MFR BLD: 5.7 %
POTASSIUM SERPL-SCNC: 5 MMOL/L (ref 3.5–5.3)
SODIUM SERPL-SCNC: 142 MMOL/L (ref 135–147)

## 2024-09-28 PROCEDURE — 36415 COLL VENOUS BLD VENIPUNCTURE: CPT

## 2024-09-28 PROCEDURE — 83036 HEMOGLOBIN GLYCOSYLATED A1C: CPT

## 2024-09-28 PROCEDURE — 80048 BASIC METABOLIC PNL TOTAL CA: CPT

## 2024-10-15 LAB
APOB+LDLR+PCSK9 GENE MUT ANL BLD/T: NOT DETECTED
BRCA1+BRCA2 DEL+DUP + FULL MUT ANL BLD/T: NOT DETECTED
MLH1+MSH2+MSH6+PMS2 GN DEL+DUP+FUL M: NOT DETECTED

## 2024-11-21 ENCOUNTER — OFFICE VISIT (OUTPATIENT)
Age: 69
End: 2024-11-21
Payer: MEDICARE

## 2024-11-21 VITALS — HEIGHT: 70 IN | BODY MASS INDEX: 24.34 KG/M2 | WEIGHT: 170 LBS | RESPIRATION RATE: 16 BRPM

## 2024-11-21 DIAGNOSIS — M21.41 ACQUIRED FLAT FOOT, RIGHT: ICD-10-CM

## 2024-11-21 DIAGNOSIS — G57.61 MORTON'S NEUROMA OF RIGHT FOOT: ICD-10-CM

## 2024-11-21 DIAGNOSIS — M21.622 TAILOR'S BUNION OF BOTH FEET: ICD-10-CM

## 2024-11-21 DIAGNOSIS — M21.621 TAILOR'S BUNION OF BOTH FEET: ICD-10-CM

## 2024-11-21 DIAGNOSIS — M79.672 PAIN IN BOTH FEET: ICD-10-CM

## 2024-11-21 DIAGNOSIS — M21.962 ACQUIRED DEFORMITY OF BOTH FEET: Primary | ICD-10-CM

## 2024-11-21 DIAGNOSIS — M79.671 PAIN IN BOTH FEET: ICD-10-CM

## 2024-11-21 DIAGNOSIS — M21.42 ACQUIRED FLAT FOOT, LEFT: ICD-10-CM

## 2024-11-21 DIAGNOSIS — M21.961 ACQUIRED DEFORMITY OF BOTH FEET: Primary | ICD-10-CM

## 2024-11-21 PROCEDURE — 99203 OFFICE O/P NEW LOW 30 MIN: CPT | Performed by: PODIATRIST

## 2024-11-21 NOTE — PROGRESS NOTES
Assessment/Plan: Acquired pes planus and deformity foot bilateral.  Metatarsus adductus.  Inflamed tailor's bunion right greater than left.  Rule out neuroma right foot.  Pain upon ambulation.    .  Chart reviewed.  PCP notes reviewed.  Patient evaluated.  At this time we need to rule out neuroma.  Ultrasound ordered.  Will evaluate osseous structure.  X-ray x-ray ordered.  Patient would benefit from pronation control.  His feet been casted for custom molded foot orthotics.  He will use these daily to control deformity and ease pain.  Tylenol as needed.  Patient advised on treatment options.  Patient may have need for surgical indication/recommendation.  Return for follow-up.         Diagnoses and all orders for this visit:    Acquired deformity of both feet  -     Device Prior Authorization; Future    Acquired flat foot, right  -     Device Prior Authorization; Future    Acquired flat foot, left  -     Device Prior Authorization; Future    Tailor's bunion of both feet  -     XR foot 3+ vw right; Future    Guerra's neuroma of right foot  -     US MSK limited; Future    Pain in both feet          Subjective: Patient has several complaints.  Patient has tailor's bunions.  He also gets pain and numbness in the ball of his feet.  He is an avid runner.  He gets symptom development after running.  No history of trauma.    No Known Allergies      Current Outpatient Medications:     amLODIPine (NORVASC) 5 mg tablet, TAKE 1 TAB IN THE MORNING, MAY TAKE ADDITIONAL 1 TAB IF BP >140/90, Disp: 180 tablet, Rfl: 0    Betahistine HCl POWD, Use 16 mg 2 (two) times a day Compounded capsule, Disp: , Rfl:     Meclizine HCl (ANTIVERT PO), Take by mouth, Disp: , Rfl:     rosuvastatin (CRESTOR) 10 MG tablet, Take 1 tablet (10 mg total) by mouth daily, Disp: 90 tablet, Rfl: 1    Patient Active Problem List   Diagnosis    Vertigo    Sinus bradycardia    Mixed hyperlipidemia    Tenosynovitis of hand    Family history of colon cancer     Sensorineural hearing loss (SNHL) of right ear    Hypertension    Pulsatile tinnitus of right ear    ONEYDA (obstructive sleep apnea)    Meniere's disease of right ear          Patient ID: Pelon Fletcher is a 69 y.o. male.    HPI    The following portions of the patient's history were reviewed and updated as appropriate:     family history includes Cancer in his mother; Hypertension in his brother and father; Stroke in his father.      reports that he has never smoked. He has never used smokeless tobacco. He reports current alcohol use of about 10.0 standard drinks of alcohol per week. He reports that he does not use drugs.    Vitals:    11/21/24 1018   Resp: 16       Review of Systems      Objective:  Patient's shoes and socks removed.   Foot Exam    General  General Appearance: appears stated age and healthy   Orientation: alert and oriented to person, place, and time   Affect: appropriate       Right Foot/Ankle     Inspection and Palpation  Tenderness: bony tenderness, lesser metatarsophalangeal joints and metatarsals   Swelling: none   Arch: pes planus  Hallux limitus: yes    Neurovascular  Dorsalis pedis: 3+  Posterior tibial: 3+      Left Foot/Ankle      Inspection and Palpation  Tenderness: bony tenderness, lesser metatarsophalangeal joints and metatarsals   Swelling: none   Arch: pes planus  Hallux limitus: yes    Neurovascular  Dorsalis pedis: 3+  Posterior tibial: 3+      Physical Exam  Vitals and nursing note reviewed.   Constitutional:       Appearance: Normal appearance.   Cardiovascular:      Rate and Rhythm: Normal rate and regular rhythm.      Pulses:           Dorsalis pedis pulses are 3+ on the right side and 3+ on the left side.        Posterior tibial pulses are 3+ on the right side and 3+ on the left side.   Musculoskeletal:      Right foot: Bony tenderness present.      Left foot: Bony tenderness present.   Feet:      Comments: Patient is pronated in stance and gait.  He demonstrates metatarsus  adductus.  Patient has enlarged inflamed tailor's bunion bilateral.  Right greater than left.  Possible Eva sign second space right foot.  Skin:     Capillary Refill: Capillary refill takes less than 2 seconds.   Neurological:      Mental Status: He is alert.   Psychiatric:         Mood and Affect: Mood normal.         Behavior: Behavior normal.         Thought Content: Thought content normal.         Judgment: Judgment normal.

## 2024-12-16 ENCOUNTER — HOSPITAL ENCOUNTER (OUTPATIENT)
Dept: ULTRASOUND IMAGING | Facility: HOSPITAL | Age: 69
Discharge: HOME | End: 2024-12-16
Payer: MEDICARE

## 2024-12-16 ENCOUNTER — HOSPITAL ENCOUNTER (OUTPATIENT)
Dept: RADIOLOGY | Facility: HOSPITAL | Age: 69
Discharge: HOME | End: 2024-12-16
Payer: MEDICARE

## 2024-12-16 ENCOUNTER — RESULTS FOLLOW-UP (OUTPATIENT)
Age: 69
End: 2024-12-16

## 2024-12-16 DIAGNOSIS — M21.621 TAILOR'S BUNION OF BOTH FEET: ICD-10-CM

## 2024-12-16 DIAGNOSIS — G57.61 MORTON'S NEUROMA OF RIGHT FOOT: ICD-10-CM

## 2024-12-16 DIAGNOSIS — M21.622 TAILOR'S BUNION OF BOTH FEET: ICD-10-CM

## 2024-12-16 PROCEDURE — 73630 X-RAY EXAM OF FOOT: CPT

## 2024-12-16 PROCEDURE — 76882 US LMTD JT/FCL EVL NVASC XTR: CPT

## 2024-12-16 NOTE — TELEPHONE ENCOUNTER
----- Message from David Trotter DPM sent at 12/16/2024  1:01 PM EST -----  Please advise patient that ultrasound demonstrates more arthritis than anything else.  Present for office visit  ----- Message -----  From: [place], Radiology Results In  Sent: 12/16/2024  11:43 AM EST  To: David Trotter DPM

## 2024-12-16 NOTE — TELEPHONE ENCOUNTER
Spoke with patient went over results he is aware and understands no questions----- Message from David Trotter DPM sent at 12/16/2024  1:04 PM EST -----  Please advise patient that as expected from clinical exam/office visit x-ray demonstrates tailor's bunion.  If pain, office visit  ----- Message -----  From: Interface, Radiology Results In  Sent: 12/16/2024  11:24 AM EST  To: David Trotter DPM

## 2025-01-30 ENCOUNTER — OFFICE VISIT (OUTPATIENT)
Age: 70
End: 2025-01-30
Payer: MEDICARE

## 2025-01-30 VITALS — BODY MASS INDEX: 24.34 KG/M2 | HEIGHT: 70 IN | RESPIRATION RATE: 17 BRPM | WEIGHT: 170 LBS

## 2025-01-30 DIAGNOSIS — M21.621 TAILOR'S BUNION OF BOTH FEET: ICD-10-CM

## 2025-01-30 DIAGNOSIS — M79.672 PAIN IN BOTH FEET: ICD-10-CM

## 2025-01-30 DIAGNOSIS — M21.962 ACQUIRED DEFORMITY OF BOTH FEET: Primary | ICD-10-CM

## 2025-01-30 DIAGNOSIS — M21.961 ACQUIRED DEFORMITY OF BOTH FEET: Primary | ICD-10-CM

## 2025-01-30 DIAGNOSIS — M21.622 TAILOR'S BUNION OF BOTH FEET: ICD-10-CM

## 2025-01-30 DIAGNOSIS — M21.41 ACQUIRED FLAT FOOT, RIGHT: ICD-10-CM

## 2025-01-30 DIAGNOSIS — M21.42 ACQUIRED FLAT FOOT, LEFT: ICD-10-CM

## 2025-01-30 DIAGNOSIS — M89.8X9 BONY EXOSTOSIS: ICD-10-CM

## 2025-01-30 DIAGNOSIS — M79.671 PAIN IN BOTH FEET: ICD-10-CM

## 2025-01-30 PROCEDURE — 99213 OFFICE O/P EST LOW 20 MIN: CPT | Performed by: PODIATRIST

## 2025-01-30 NOTE — PROGRESS NOTES
Assessment/Plan:  Inflamed tailor's bunion right foot with secondary exostosis.  Pain.  Acquired deformity foot bilateral.  Acquired pes planus.  Negative evidence of neuroma.    Plan.  Chart reviewed.  PCP notes reviewed.  X-ray and ultrasound reviewed with patient.  At this time patient would like to try padding of tailor's bunion of the right foot.  This was dispensed.  In addition he will use orthotics as directed.  If condition worsens patient will consider surgical intervention/ostectomy tailor's bunion fifth ray right foot.  Aftercare instruction given.  Return as needed.       Diagnoses and all orders for this visit:    Acquired deformity of both feet    Tailor's bunion of both feet    Acquired flat foot, right    Acquired flat foot, left    Pain in both feet    Bony exostosis          Subjective: Patient is doing better.  He is using orthotics.  He had tests as prescribed.    No Known Allergies      Current Outpatient Medications:     amLODIPine (NORVASC) 5 mg tablet, TAKE 1 TAB IN THE MORNING, MAY TAKE ADDITIONAL 1 TAB IF BP >140/90, Disp: 180 tablet, Rfl: 0    Betahistine HCl POWD, Use 16 mg 2 (two) times a day Compounded capsule, Disp: , Rfl:     Meclizine HCl (ANTIVERT PO), Take by mouth, Disp: , Rfl:     rosuvastatin (CRESTOR) 10 MG tablet, Take 1 tablet (10 mg total) by mouth daily, Disp: 90 tablet, Rfl: 1    Patient Active Problem List   Diagnosis    Vertigo    Sinus bradycardia    Mixed hyperlipidemia    Tenosynovitis of hand    Family history of colon cancer    Sensorineural hearing loss (SNHL) of right ear    Hypertension    Pulsatile tinnitus of right ear    ONEYDA (obstructive sleep apnea)    Meniere's disease of right ear          Patient ID: Pelon Fletcher is a 69 y.o. male.    HPI    The following portions of the patient's history were reviewed and updated as appropriate:     family history includes Cancer in his mother; Hypertension in his brother and father; Stroke in his father.      reports  that he has never smoked. He has never used smokeless tobacco. He reports current alcohol use of about 10.0 standard drinks of alcohol per week. He reports that he does not use drugs.    Vitals:    01/30/25 1019   Resp: 17       Review of Systems      Objective:  Patient's shoes and socks removed.   Foot ExamPhysical Exam      General  General Appearance: appears stated age and healthy   Orientation: alert and oriented to person, place, and time   Affect: appropriate         Right Foot/Ankle      Inspection and Palpation  Tenderness: bony tenderness, lesser metatarsophalangeal joints and metatarsals   Swelling: none   Arch: pes planus  Hallux limitus: yes     Neurovascular  Dorsalis pedis: 3+  Posterior tibial: 3+        Left Foot/Ankle       Inspection and Palpation  Tenderness: bony tenderness, lesser metatarsophalangeal joints and metatarsals   Swelling: none   Arch: pes planus  Hallux limitus: yes     Neurovascular  Dorsalis pedis: 3+  Posterior tibial: 3+        Physical Exam  Vitals and nursing note reviewed.   Constitutional:       Appearance: Normal appearance.   Cardiovascular:      Rate and Rhythm: Normal rate and regular rhythm.      Pulses:           Dorsalis pedis pulses are 3+ on the right side and 3+ on the left side.        Posterior tibial pulses are 3+ on the right side and 3+ on the left side.   Musculoskeletal:      Right foot: Bony tenderness present.      Left foot: Bony tenderness present.   Feet:      Comments: Patient is pronated in stance and gait.  He demonstrates metatarsus adductus.  Patient has enlarged inflamed tailor's bunion bilateral.  Right greater than left.  Possible Eva sign second space right foot.  Ultrasound demonstrates no evidence of occult neuroma.  X-ray demonstrates enlarged fifth metatarsal head with lateral exostosis.  Skin:     Capillary Refill: Capillary refill takes less than 2 seconds.   Neurological:      Mental Status: He is alert.   Psychiatric:         Mood  and Affect: Mood normal.         Behavior: Behavior normal.         Thought Content: Thought content normal.         Judgment: Judgment normal.

## 2025-02-28 DIAGNOSIS — E78.2 MIXED HYPERLIPIDEMIA: ICD-10-CM

## 2025-02-28 DIAGNOSIS — I10 HYPERTENSION, UNSPECIFIED TYPE: ICD-10-CM

## 2025-02-28 RX ORDER — ROSUVASTATIN CALCIUM 10 MG/1
10 TABLET, COATED ORAL DAILY
Qty: 90 TABLET | Refills: 1 | Status: SHIPPED | OUTPATIENT
Start: 2025-02-28

## 2025-02-28 RX ORDER — AMLODIPINE BESYLATE 5 MG/1
TABLET ORAL
Qty: 180 TABLET | Refills: 1 | Status: SHIPPED | OUTPATIENT
Start: 2025-02-28

## 2025-05-02 ENCOUNTER — APPOINTMENT (OUTPATIENT)
Dept: LAB | Facility: CLINIC | Age: 70
End: 2025-05-02
Payer: MEDICARE

## 2025-05-02 DIAGNOSIS — I10 HYPERTENSION, UNSPECIFIED TYPE: ICD-10-CM

## 2025-05-02 DIAGNOSIS — E78.2 MIXED HYPERLIPIDEMIA: ICD-10-CM

## 2025-05-02 DIAGNOSIS — Z12.5 SCREENING FOR PROSTATE CANCER: ICD-10-CM

## 2025-05-02 LAB
ALBUMIN SERPL BCG-MCNC: 4.4 G/DL (ref 3.5–5)
ALP SERPL-CCNC: 44 U/L (ref 34–104)
ALT SERPL W P-5'-P-CCNC: 22 U/L (ref 7–52)
ANION GAP SERPL CALCULATED.3IONS-SCNC: 4 MMOL/L (ref 4–13)
AST SERPL W P-5'-P-CCNC: 28 U/L (ref 13–39)
BILIRUB SERPL-MCNC: 1.98 MG/DL (ref 0.2–1)
BUN SERPL-MCNC: 18 MG/DL (ref 5–25)
CALCIUM SERPL-MCNC: 9.6 MG/DL (ref 8.4–10.2)
CHLORIDE SERPL-SCNC: 103 MMOL/L (ref 96–108)
CHOLEST SERPL-MCNC: 226 MG/DL (ref ?–200)
CO2 SERPL-SCNC: 31 MMOL/L (ref 21–32)
CREAT SERPL-MCNC: 1.04 MG/DL (ref 0.6–1.3)
ERYTHROCYTE [DISTWIDTH] IN BLOOD BY AUTOMATED COUNT: 14.1 % (ref 11.6–15.1)
GFR SERPL CREATININE-BSD FRML MDRD: 73 ML/MIN/1.73SQ M
GLUCOSE P FAST SERPL-MCNC: 86 MG/DL (ref 65–99)
HCT VFR BLD AUTO: 47.3 % (ref 36.5–49.3)
HDLC SERPL-MCNC: 88 MG/DL
HGB BLD-MCNC: 14.9 G/DL (ref 12–17)
LDLC SERPL CALC-MCNC: 129 MG/DL (ref 0–100)
MCH RBC QN AUTO: 28.2 PG (ref 26.8–34.3)
MCHC RBC AUTO-ENTMCNC: 31.5 G/DL (ref 31.4–37.4)
MCV RBC AUTO: 90 FL (ref 82–98)
NONHDLC SERPL-MCNC: 138 MG/DL
PLATELET # BLD AUTO: 244 THOUSANDS/UL (ref 149–390)
PMV BLD AUTO: 11.4 FL (ref 8.9–12.7)
POTASSIUM SERPL-SCNC: 5.9 MMOL/L (ref 3.5–5.3)
PROT SERPL-MCNC: 7.1 G/DL (ref 6.4–8.4)
PSA SERPL-MCNC: 0.52 NG/ML (ref 0–4)
RBC # BLD AUTO: 5.28 MILLION/UL (ref 3.88–5.62)
SODIUM SERPL-SCNC: 138 MMOL/L (ref 135–147)
TRIGL SERPL-MCNC: 47 MG/DL (ref ?–150)
TSH SERPL DL<=0.05 MIU/L-ACNC: 1.76 UIU/ML (ref 0.45–4.5)
WBC # BLD AUTO: 4.17 THOUSAND/UL (ref 4.31–10.16)

## 2025-05-02 PROCEDURE — 80061 LIPID PANEL: CPT

## 2025-05-02 PROCEDURE — 36415 COLL VENOUS BLD VENIPUNCTURE: CPT

## 2025-05-02 PROCEDURE — 85027 COMPLETE CBC AUTOMATED: CPT

## 2025-05-02 PROCEDURE — 84443 ASSAY THYROID STIM HORMONE: CPT

## 2025-05-02 PROCEDURE — G0103 PSA SCREENING: HCPCS

## 2025-05-02 PROCEDURE — 80053 COMPREHEN METABOLIC PANEL: CPT

## 2025-05-06 ENCOUNTER — OFFICE VISIT (OUTPATIENT)
Age: 70
End: 2025-05-06
Payer: MEDICARE

## 2025-05-06 VITALS
HEART RATE: 52 BPM | WEIGHT: 171 LBS | SYSTOLIC BLOOD PRESSURE: 136 MMHG | RESPIRATION RATE: 16 BRPM | DIASTOLIC BLOOD PRESSURE: 86 MMHG | OXYGEN SATURATION: 97 % | TEMPERATURE: 96.7 F | HEIGHT: 70 IN | BODY MASS INDEX: 24.48 KG/M2

## 2025-05-06 DIAGNOSIS — G47.33 OSA (OBSTRUCTIVE SLEEP APNEA): ICD-10-CM

## 2025-05-06 DIAGNOSIS — I10 PRIMARY HYPERTENSION: ICD-10-CM

## 2025-05-06 DIAGNOSIS — E87.5 HYPERKALEMIA: ICD-10-CM

## 2025-05-06 DIAGNOSIS — D72.819 LEUKOPENIA, UNSPECIFIED TYPE: Primary | ICD-10-CM

## 2025-05-06 DIAGNOSIS — R42 VERTIGO: ICD-10-CM

## 2025-05-06 DIAGNOSIS — E78.2 MIXED HYPERLIPIDEMIA: ICD-10-CM

## 2025-05-06 PROCEDURE — 99214 OFFICE O/P EST MOD 30 MIN: CPT | Performed by: INTERNAL MEDICINE

## 2025-05-06 PROCEDURE — G2211 COMPLEX E/M VISIT ADD ON: HCPCS | Performed by: INTERNAL MEDICINE

## 2025-05-06 NOTE — ASSESSMENT & PLAN NOTE
-slightly elevated from previous due to dietary change  -continue rosuvastatin 10mg daily  -repeat labs prior to next visit  Orders:  •  Lipid panel; Future  •  Comprehensive metabolic panel; Future

## 2025-05-06 NOTE — PROGRESS NOTES
Name: Pelon Fletcher      : 1955      MRN: [id number]  Encounter Provider: Vivian Romero DO  Encounter Date: 2025   Encounter department: [facility] INTERNAL MEDICINE  :  Assessment & Plan  Leukopenia, unspecified type  -mild, asymptomatic  -repeat CBCD   Orders:  •  CBC and differential; Future    Hyperkalemia  -mild  -avoid high potassium foods  -recheck BMP  Orders:  •  Basic metabolic panel; Future    Mixed hyperlipidemia  -slightly elevated from previous due to dietary change  -continue rosuvastatin 10mg daily  -repeat labs prior to next visit  Orders:  •  Lipid panel; Future  •  Comprehensive metabolic panel; Future    Primary hypertension  -fair control in office, home bp are adequately controlled  -follow low sodium diet  -continue amlodipine 5mg daily with instructions to take additional tab if BP >140/90       Vertigo  -recurrent, associated with tinnitus  -on betahistine and meclizine per ENT       ONEYDA (obstructive sleep apnea)  -moderate in severity  -compliant with CPAP  -follow up with Sleep Medicine             Depression Screening and Follow-up Plan: Patient was screened for depression during today's encounter. They screened negative with a PHQ-2 score of 0.        History of Present Illness   HPI    Spent 10weeks in [place], diet changed.    Home BP around <130/70s.  He has not needed to take additional dosing of amlodipine    Continues to take meclizine twice daily, follows with ENT.    Using CPAP, does not feel different.  Denies fatigue.    Review of Systems   Constitutional:  Positive for appetite change. Negative for activity change and unexpected weight change.   HENT:  Negative for congestion, rhinorrhea and sinus pain.    Respiratory:  Negative for cough, shortness of breath and wheezing.    Cardiovascular:  Negative for chest pain, palpitations and leg swelling.   Neurological:  Positive for dizziness. Negative for headaches.   Psychiatric/Behavioral:   "Negative for dysphoric mood.        Current Outpatient Medications:   •  amLODIPine (NORVASC) 5 mg tablet, TAKE 1 TAB IN THE MORNING, MAY TAKE ADDITIONAL 1 TAB IF BP >140/90, Disp: 180 tablet, Rfl: 1  •  Betahistine HCl POWD, Use 16 mg 2 (two) times a day Compounded capsule, Disp: , Rfl:   •  Meclizine HCl (ANTIVERT PO), Take by mouth, Disp: , Rfl:   •  rosuvastatin (CRESTOR) 10 MG tablet, Take 1 tablet (10 mg total) by mouth daily, Disp: 90 tablet, Rfl: 1    Objective   /86 (BP Location: Left arm, Patient Position: Sitting, Cuff Size: Standard)   Pulse (!) 52   Temp (!) 96.7 °F (35.9 °C)   Resp 16   Ht 5' 10\" (1.778 m)   Wt 77.6 kg (171 lb)   SpO2 97%   BMI 24.54 kg/m²      Physical Exam  Vitals reviewed.   Constitutional:       General: He is not in acute distress.     Appearance: He is normal weight.   Cardiovascular:      Rate and Rhythm: Regular rhythm. Bradycardia present.      Pulses: Normal pulses.      Heart sounds: Normal heart sounds.   Pulmonary:      Effort: Pulmonary effort is normal. No respiratory distress.      Breath sounds: Normal breath sounds. No wheezing.   Musculoskeletal:      Right lower leg: No edema.      Left lower leg: No edema.   Skin:     General: Skin is dry.   Neurological:      Mental Status: He is alert and oriented to person, place, and time.   Psychiatric:         Mood and Affect: Mood normal.         Recent Results (from the past 2 weeks)   PSA, Total Screen    Collection Time: 05/02/25  8:59 AM   Result Value Ref Range    PSA 0.516 0.000 - 4.000 ng/mL   Lipid panel    Collection Time: 05/02/25  8:59 AM   Result Value Ref Range    Cholesterol 226 (H) See Comment mg/dL    Triglycerides 47 See Comment mg/dL    HDL, Direct 88 >=40 mg/dL    LDL Calculated 129 (H) 0 - 100 mg/dL    Non-HDL-Chol (CHOL-HDL) 138 mg/dl   Comprehensive metabolic panel    Collection Time: 05/02/25  8:59 AM   Result Value Ref Range    Sodium 138 135 - 147 mmol/L    Potassium 5.9 (H) 3.5 - 5.3 " mmol/L    Chloride 103 96 - 108 mmol/L    CO2 31 21 - 32 mmol/L    ANION GAP 4 4 - 13 mmol/L    BUN 18 5 - 25 mg/dL    Creatinine 1.04 0.60 - 1.30 mg/dL    Glucose, Fasting 86 65 - 99 mg/dL    Calcium 9.6 8.4 - 10.2 mg/dL    AST 28 13 - 39 U/L    ALT 22 7 - 52 U/L    Alkaline Phosphatase 44 34 - 104 U/L    Total Protein 7.1 6.4 - 8.4 g/dL    Albumin 4.4 3.5 - 5.0 g/dL    Total Bilirubin 1.98 (H) 0.20 - 1.00 mg/dL    eGFR 73 ml/min/1.73sq m   CBC    Collection Time: 05/02/25  8:59 AM   Result Value Ref Range    WBC 4.17 (L) 4.31 - 10.16 Thousand/uL    RBC 5.28 3.88 - 5.62 Million/uL    Hemoglobin 14.9 12.0 - 17.0 g/dL    Hematocrit 47.3 36.5 - 49.3 %    MCV 90 82 - 98 fL    MCH 28.2 26.8 - 34.3 pg    MCHC 31.5 31.4 - 37.4 g/dL    RDW 14.1 11.6 - 15.1 %    Platelets 244 149 - 390 Thousands/uL    MPV 11.4 8.9 - 12.7 fL   TSH, 3rd generation with Free T4 reflex    Collection Time: 05/02/25  8:59 AM   Result Value Ref Range    TSH 3RD GENERATON 1.758 0.450 - 4.500 uIU/mL

## 2025-05-06 NOTE — ASSESSMENT & PLAN NOTE
-fair control in office, home bp are adequately controlled  -follow low sodium diet  -continue amlodipine 5mg daily with instructions to take additional tab if BP >140/90

## 2025-05-08 ENCOUNTER — APPOINTMENT (OUTPATIENT)
Dept: LAB | Facility: CLINIC | Age: 70
End: 2025-05-08
Payer: MEDICARE

## 2025-05-08 DIAGNOSIS — D72.819 LEUKOPENIA, UNSPECIFIED TYPE: ICD-10-CM

## 2025-05-08 DIAGNOSIS — E87.5 HYPERKALEMIA: ICD-10-CM

## 2025-05-08 LAB
ANION GAP SERPL CALCULATED.3IONS-SCNC: 7 MMOL/L (ref 4–13)
BASOPHILS # BLD AUTO: 0.03 THOUSANDS/ÂΜL (ref 0–0.1)
BASOPHILS NFR BLD AUTO: 1 % (ref 0–1)
BUN SERPL-MCNC: 22 MG/DL (ref 5–25)
CALCIUM SERPL-MCNC: 9.3 MG/DL (ref 8.4–10.2)
CHLORIDE SERPL-SCNC: 104 MMOL/L (ref 96–108)
CO2 SERPL-SCNC: 28 MMOL/L (ref 21–32)
CREAT SERPL-MCNC: 1.08 MG/DL (ref 0.6–1.3)
EOSINOPHIL # BLD AUTO: 0.28 THOUSAND/ÂΜL (ref 0–0.61)
EOSINOPHIL NFR BLD AUTO: 6 % (ref 0–6)
ERYTHROCYTE [DISTWIDTH] IN BLOOD BY AUTOMATED COUNT: 14.3 % (ref 11.6–15.1)
GFR SERPL CREATININE-BSD FRML MDRD: 70 ML/MIN/1.73SQ M
GLUCOSE P FAST SERPL-MCNC: 99 MG/DL (ref 65–99)
HCT VFR BLD AUTO: 46 % (ref 36.5–49.3)
HGB BLD-MCNC: 14.7 G/DL (ref 12–17)
IMM GRANULOCYTES # BLD AUTO: 0.01 THOUSAND/UL (ref 0–0.2)
IMM GRANULOCYTES NFR BLD AUTO: 0 % (ref 0–2)
LYMPHOCYTES # BLD AUTO: 1.58 THOUSANDS/ÂΜL (ref 0.6–4.47)
LYMPHOCYTES NFR BLD AUTO: 32 % (ref 14–44)
MCH RBC QN AUTO: 29.1 PG (ref 26.8–34.3)
MCHC RBC AUTO-ENTMCNC: 32 G/DL (ref 31.4–37.4)
MCV RBC AUTO: 91 FL (ref 82–98)
MONOCYTES # BLD AUTO: 0.46 THOUSAND/ÂΜL (ref 0.17–1.22)
MONOCYTES NFR BLD AUTO: 9 % (ref 4–12)
NEUTROPHILS # BLD AUTO: 2.64 THOUSANDS/ÂΜL (ref 1.85–7.62)
NEUTS SEG NFR BLD AUTO: 52 % (ref 43–75)
NRBC BLD AUTO-RTO: 0 /100 WBCS
PLATELET # BLD AUTO: 247 THOUSANDS/UL (ref 149–390)
PMV BLD AUTO: 11.8 FL (ref 8.9–12.7)
POTASSIUM SERPL-SCNC: 4.6 MMOL/L (ref 3.5–5.3)
RBC # BLD AUTO: 5.06 MILLION/UL (ref 3.88–5.62)
SODIUM SERPL-SCNC: 139 MMOL/L (ref 135–147)
WBC # BLD AUTO: 5 THOUSAND/UL (ref 4.31–10.16)

## 2025-05-08 PROCEDURE — 80048 BASIC METABOLIC PNL TOTAL CA: CPT

## 2025-05-08 PROCEDURE — 85025 COMPLETE CBC W/AUTO DIFF WBC: CPT

## 2025-05-08 PROCEDURE — 36415 COLL VENOUS BLD VENIPUNCTURE: CPT

## 2025-05-09 ENCOUNTER — RESULTS FOLLOW-UP (OUTPATIENT)
Age: 70
End: 2025-05-09

## 2025-05-27 ENCOUNTER — OFFICE VISIT (OUTPATIENT)
Dept: SLEEP CENTER | Facility: HOSPITAL | Age: 70
End: 2025-05-27
Payer: MEDICARE

## 2025-05-27 VITALS
SYSTOLIC BLOOD PRESSURE: 138 MMHG | WEIGHT: 170 LBS | BODY MASS INDEX: 24.34 KG/M2 | HEIGHT: 70 IN | DIASTOLIC BLOOD PRESSURE: 80 MMHG

## 2025-05-27 DIAGNOSIS — G47.19 EXCESSIVE DAYTIME SLEEPINESS: ICD-10-CM

## 2025-05-27 DIAGNOSIS — I51.7 LAE (LEFT ATRIAL ENLARGEMENT): ICD-10-CM

## 2025-05-27 DIAGNOSIS — G47.33 OSA (OBSTRUCTIVE SLEEP APNEA): Primary | ICD-10-CM

## 2025-05-27 DIAGNOSIS — I10 PRIMARY HYPERTENSION: ICD-10-CM

## 2025-05-27 DIAGNOSIS — G47.34 SLEEP RELATED HYPOXIA: ICD-10-CM

## 2025-05-27 PROCEDURE — G2211 COMPLEX E/M VISIT ADD ON: HCPCS | Performed by: INTERNAL MEDICINE

## 2025-05-27 PROCEDURE — 99214 OFFICE O/P EST MOD 30 MIN: CPT | Performed by: INTERNAL MEDICINE

## 2025-05-27 NOTE — PROGRESS NOTES
Name: Pelon Fletcher      : 1955      MRN: [id number]  Encounter Provider: Hi Flores MD  Encounter Date: 2025   Enc[facility]  :  Assessment & Plan  ONEYDA (obstructive sleep apnea)    Orders:    PAP DME Resupply/Reorder    Sleep related hypoxia         Excessive daytime sleepiness         Primary hypertension         LAE (left atrial enlargement)               PLAN: Above listed Problems & Comorbidities were Addressed this Visit.  Improved/stable/controlled/resolved conditions as reviewed in notes.   Results of prior studies and physiologic data reviewed  with the patient.   I discussed treatment options with risks and benefits.  Treatment with  PAP is medically necessary and Pelon is agreable to continue use.   Care of equipment, methods to improve comfort using PAP and importance of compliance with therapy were discussed.  Pressure setting:continue 10-11 cmH2O. Mask type nasal pillows  Rx provided to replace supplies and Care coordinated with DME provider.   Discussed strategies for weight reduction.    Follow-up is advised in 1 year or sooner if needed to monitor progress, compliance and to adjust therapy.        History of Present Illness   HPI          Follow-Up Note - Sleep Center   Pelon Fletcher  70 y.o. male  :1955  MRN:[id number]  DOS:2025    CC: I saw this patient for follow-up in clinic today for Sleep disordered breathing, Coexisting Sleep and Medical Problems.  Patient is using a ResMed machine.. Interval changes: None reported.      HST in 2023 demonstrated a respiratory event index (LEONORA) of 19.6.  The lowest SpO2 recorded is 77% and 40.8 minutes during the study was spent with saturations below 90%.  The snore index was 53.9%.during the subsequent titration study sleep disordered breathing was successfully remediated with PAP at 11 cm H2O. he had difficulty maintaining sleep and sleep efficiency was reduced at  "66%.    PFSH, Problem List, Medications & Allergies were reviewed in EMR.   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    He has a current medication list which includes the following prescription(s): amlodipine, betahistine hcl, meclizine hcl, and rosuvastatin.    PHYSIOLOGICAL DATA REVIEW : Using PAP > 4 hours/night 90%. Estimated LEONORA 0.7/hour with pressure of 10.5cm H2O@90th/95th percentile;.  INTERPRETATION: Compliance is excellent; Pressure setting is:optimal; ;     SUBJECTIVE: With respect to use of PAP, Pelon  is experiencing minimal adverse effects: dry mouth/throat.He derives benefit.. Is satisfied with sleep and daytime function.     Sleep Routine: Pelon reports getting (Patient-Rptd) (P) 8 hrs sleep; he has no difficulty initiating and maintaining sleep . He arises spontaneously and (Patient-Rptd) (P) Usually feels refreshed.Pelon] reports significantly improved daytime sleepiness,.  He rated himself at Total score: (Patient-Rptd) (P) 5 /24 on the Ru[place] Sleepiness Scale.   Other issues: None reported.     Habits: Reports that he has never smoked. He has never used smokeless tobacco.,  Reports current alcohol use of about 10.0 standard drinks of alcohol per week.,  Reports no history of drug use., Caffeine use: none until; Exercise routine: regular.      ROS: Significant for weight has been stable.  He is reporting no nasal, respiratory or cardiac symptoms..    EXAM: /80   Ht 5' 10\" (1.778 m)   Wt 77.1 kg (170 lb)   BMI 24.39 kg/m²     Wt Readings from Last 3 Encounters:   05/27/25 77.1 kg (170 lb)   05/06/25 77.6 kg (171 lb)   01/30/25 77.1 kg (170 lb)      Patient is well groomed; well appearing.   CNS: Alert, " "orientated, speech clear & coherent  Psych: cooperative and in no distress. Mental state: Appears normal.  H&N: EOMI; NC/AT: No facial pressure marks, no rashes.    Skin/Extrem: col & hydration normal; no edema  Resp: Respiratory effort is normal  Physical findings otherwise essentially unchanged from previous.    Lab Results   Component Value Date    SODIUM 139 05/08/2025    K 4.6 05/08/2025     05/08/2025    CO2 28 05/08/2025    AGAP 7 05/08/2025    BUN 22 05/08/2025    CREATININE 1.08 05/08/2025    GLUC 93 12/23/2022    GLUF 99 05/08/2025    CALCIUM 9.3 05/08/2025    AST 28 05/02/2025    ALT 22 05/02/2025    ALKPHOS 44 05/02/2025    TP 7.1 05/02/2025    TBILI 1.98 (H) 05/02/2025    EGFR 70 05/08/2025   ;   Lab Results   Component Value Date    WBC 5.00 05/08/2025    HGB 14.7 05/08/2025    HCT 46.0 05/08/2025    MCV 91 05/08/2025     05/08/2025   : [unfilled]    Sincerely,     Authenticated electronically on 05/27/25   Board Certified Specialist     Portions of the record may have been created with voice recognition software. Occasional wrong word or \"sound a like\" substitutions may have occurred due to the inherent limitations of voice recognition software. There may also be notations and random deletions of words or characters from malfunctioning software. Read the chart carefully and recognize, using context, where substitutions/deletions have occurred.          Review of Systems           "

## 2025-05-28 ENCOUNTER — TELEPHONE (OUTPATIENT)
Dept: SLEEP CENTER | Facility: CLINIC | Age: 70
End: 2025-05-28

## 2025-05-28 LAB

## 2025-06-13 DIAGNOSIS — E78.2 MIXED HYPERLIPIDEMIA: ICD-10-CM

## 2025-06-13 RX ORDER — ROSUVASTATIN CALCIUM 10 MG/1
10 TABLET, COATED ORAL DAILY
Qty: 90 TABLET | Refills: 1 | Status: SHIPPED | OUTPATIENT
Start: 2025-06-13

## 2025-06-19 ENCOUNTER — OFFICE VISIT (OUTPATIENT)
Dept: PHYSICAL THERAPY | Facility: CLINIC | Age: 70
End: 2025-06-19
Payer: MEDICARE

## 2025-06-19 DIAGNOSIS — S76.312A HAMSTRING STRAIN, LEFT, INITIAL ENCOUNTER: ICD-10-CM

## 2025-06-19 DIAGNOSIS — S76.012D MUSCLE STRAIN OF LEFT GLUTEAL REGION, SUBSEQUENT ENCOUNTER: Primary | ICD-10-CM

## 2025-06-19 PROCEDURE — 97110 THERAPEUTIC EXERCISES: CPT | Performed by: PHYSICAL THERAPIST

## 2025-06-19 PROCEDURE — 97162 PT EVAL MOD COMPLEX 30 MIN: CPT | Performed by: PHYSICAL THERAPIST

## 2025-06-19 NOTE — PROGRESS NOTES
PT Evaluation     Today's date: 2025  Patient name: Pelon Fletcher  : 1955  MRN: [id number]  Referring provider: Vivian Romero DO  Dx:   Encounter Diagnosis     ICD-10-CM    1. Muscle strain of left gluteal region, subsequent encounter  S76.012D       2. 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  Findings today are consistent with glute and hamstring strain.  He presents with some decreased LE strength and muscle activation and obvious flexibility limitations hamstring/glute.  He also presents with poor balance and LE motor control. He requests a few sessions to facilitate continued improvements in pain  with prolonged sitting and stair negotiation and a graded return to training.   Understanding of Dx/Px/POC: good     Prognosis: good    Goals  Short Term Goals (4-6 weeks):  Pt will ambulate at least 10 minutes continuously without AD.  Pt will ascend stairs with reciprocal pattern safely and pain free    Long Term Goals (8-10 weeks):  Pt will present with full knee AROM to decrease difficulty with all functional mobility tasks.  Pt will ascend/descend stairs with reciprocal pattern safely.  Pt will be independent with comprehensive HEP.        Plan    Planned therapy interventions: joint mobilization, manual therapy, therapeutic activities, therapeutic exercise, graded exercise, home exercise program, flexibility and functional ROM exercises    Frequency: 1x week  Duration in weeks: 8  Plan of Care beginning date: 2025  Plan of Care expiration date: 2025  Treatment plan discussed with:  "patient    Subjective Evaluation    History of Present Illness  Mechanism of injury: About 4 weeks ago multiple speed training sessions in preparation for upcoming race with gradual and then marked increase in left sided thigh and glute pain.  He has been adjusting training volume and this has helped.  At this time along with increased difficulty and pain with sitting, and stairs, he also continues to report limitations in running tolerance.  He is looking for some guidance to decrease pain and stiffness.   Patient Goals  Patient goals for therapy: decreased pain and increased strength    Pain  Current pain ratin  At worst pain ratin  Location: Left Hamstring/Left glute    Treatments  Current treatment: physical therapy    Objective:     Posture:   Moderate increase in midthoracic kyphosis    Gait:  No obvious antalgic gait     ROM:    Hip PROM (degrees):     Left  Right  Hip flexion  115  125  Hip extension 5  10    Mild pain with overpressure into end range hip flexion (concordant glute pain)     Strength:     Left  Right  Hip flexion  5/5  5/5  Knee extension 5/5  5/5  Knee flexion  4/5  5/5  Ankle DF  5/5  5/5  Great toe ext  5/5  5/5    Hip abduction  5/5  5/5  Hip ER   4/5  5/5  Hip extension  5/5  5/5    Mild pain with resisted hip ER and knee flexion L     Flexibility:    Pain reproduction with conservative glute and hamstring stretching L    Squat: to depth pain free  SLStance: extremely poor   Stairs: Step up lift 8\" with some unsteadiness    Special Tests:   CHACHO: Mild pain R               Daily Treatment Diary     Precautions: Past Medical History[1]    POC Expires Reeval for Medicare to be completed  Unit Limit Auth Expiration Date PT/OT/STVisit Limit   2025 By visit 10 6 NA NA    Completed on visit                    Auth Status DATE         NA Visit # 1         Remaining 9        MANUAL THERAPY         Hamstring STM         Glute STM                                           "   THERAPEUTIC EXERCISE HEP         Review of pre-running warm up X X                  Dynamic Hip Flexion Stretch X 2 laps        Dynamic Stretch Hamstring Stretch X 2 laps                   Hamstring Iso @ Table X 10x, 3 sec                   Conservative Seated HS Stretch X 20 sec        Quadruped Rock Back Stretch X 10x                                                                    NEUROMUSCULAR REEDUCATION                                                                                                                                                       THERAPEUTIC ACTIVITY                                                  GAIT TRAINING                                                  MODALITIES                                              [1]   Past Medical History:  Diagnosis Date    Elevated BP without diagnosis of hypertension 1/7/2021    Groin lump 1/7/2021    HL (hearing loss) 2018    Recently more sever    Hypertension 2010 - 2012    resolved    Hypocalcemia 12/17/2020    Pneumonia     In past    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    Squamous cell skin cancer     left forearm

## 2025-06-24 ENCOUNTER — OFFICE VISIT (OUTPATIENT)
Dept: PHYSICAL THERAPY | Facility: CLINIC | Age: 70
End: 2025-06-24
Payer: MEDICARE

## 2025-06-24 DIAGNOSIS — S76.312A HAMSTRING STRAIN, LEFT, INITIAL ENCOUNTER: ICD-10-CM

## 2025-06-24 DIAGNOSIS — S76.012D MUSCLE STRAIN OF LEFT GLUTEAL REGION, SUBSEQUENT ENCOUNTER: Primary | ICD-10-CM

## 2025-06-24 PROCEDURE — 97112 NEUROMUSCULAR REEDUCATION: CPT | Performed by: PHYSICAL THERAPIST

## 2025-06-24 PROCEDURE — 97110 THERAPEUTIC EXERCISES: CPT | Performed by: PHYSICAL THERAPIST

## 2025-06-24 NOTE — PROGRESS NOTES
Daily Note     Today's date: 2025  Patient name: Pelon Fletcher  : 1955  MRN: [id number]  Referring provider: Vivian Romero DO  Dx:   Encounter Diagnosis     ICD-10-CM    1. Muscle strain of left gluteal region, subsequent encounter  S76.012D       2. Hamstring strain, left, initial encounter  S76.312A                      Subjective: Pelon feels like he continues to make consistent progress with current program.       Objective: See treatment diary below      Assessment: Tolerated treatment well. Patient demonstrated fatigue post treatment.  Conservative progression into hamstring activation and eccentric strengthening       Plan: Continue per plan of care.      Daily Treatment Diary     Precautions: Past Medical History[1]    POC Expires Reeval for Medicare to be completed  Unit Limit Auth Expiration Date PT/OT/STVisit Limit   2025 By visit 10 6 NA NA    Completed on visit                    Auth Status DATE        NA Visit # 1 2        Remaining 9 8       MANUAL THERAPY         Hamstring STM         Glute STM                                             THERAPEUTIC EXERCISE HEP         Review of pre-running warm up X X                  Dynamic Hip Flexion Stretch X 2 laps 4 laps       Dynamic Stretch Hamstring Stretch X 2 laps  4 laps                  Hamstring Iso @ Table X 10x, 3 sec  10x, 3 sec                  Conservative Seated HS Stretch X 20 sec 30 sec        Quadruped Rock Back Stretch X 10x Left leg hooked around R ankle, 5x10 sec                                                                    NEUROMUSCULAR REEDUCATION           Hamstring Bridge   10x       Hamstring Swiss Bal Rollout B/L   2x10                                                                                                                                THERAPEUTIC ACTIVITY                                                  GAIT TRAINING                                                  MODALITIES                                                 [1]   Past Medical History:  Diagnosis Date    Elevated BP without diagnosis of hypertension 1/7/2021    Groin lump 1/7/2021    HL (hearing loss) 2018    Recently more sever    Hypertension 2010 - 2012    resolved    Hypocalcemia 12/17/2020    Pneumonia     In past    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    Squamous cell skin cancer     left forearm

## 2025-07-07 ENCOUNTER — OFFICE VISIT (OUTPATIENT)
Dept: PHYSICAL THERAPY | Facility: CLINIC | Age: 70
End: 2025-07-07
Payer: MEDICARE

## 2025-07-07 DIAGNOSIS — S76.312A HAMSTRING STRAIN, LEFT, INITIAL ENCOUNTER: ICD-10-CM

## 2025-07-07 DIAGNOSIS — S76.012D MUSCLE STRAIN OF LEFT GLUTEAL REGION, SUBSEQUENT ENCOUNTER: Primary | ICD-10-CM

## 2025-07-07 PROCEDURE — 97110 THERAPEUTIC EXERCISES: CPT | Performed by: PHYSICAL THERAPIST

## 2025-07-07 PROCEDURE — 97112 NEUROMUSCULAR REEDUCATION: CPT | Performed by: PHYSICAL THERAPIST

## 2025-07-07 NOTE — PROGRESS NOTES
Daily Note     Today's date: 2025  Patient name: Pelon Fletcher  : 1955  MRN: [id number]  Referring provider: Vivian Romero DO  Dx:   Encounter Diagnosis     ICD-10-CM    1. Muscle strain of left gluteal region, subsequent encounter  S76.012D       2. Hamstring strain, left, initial encounter  S76.312A                      Subjective: Pelon feels he has made obvious progress.  Pain has improved consistently, training is going better.  Feels he can continue the exercise @ home.       Objective: See treatment diary below      Assessment: Tolerated treatment well. Secondary to above, patient with tenative discharge today. Will follow up with any continued issues.       Plan: DC     Daily Treatment Diary     Precautions: Past Medical History[1]    POC Expires Reeval for Medicare to be completed  Unit Limit Auth Expiration Date PT/OT/STVisit Limit   2025 By visit 10 6 NA NA    Completed on visit                    Auth Status DATE  7/7      NA Visit # 1 2 3       Remaining 9 8 7      MANUAL THERAPY         Hamstring STM   ats      Glute STM   ats                                          THERAPEUTIC EXERCISE HEP         Review of pre-running warm up X X                  Dynamic Hip Flexion Stretch X 2 laps 4 laps 4 laps       Dynamic Stretch Hamstring Stretch X 2 laps  4 laps  4 laps                 Hamstring Iso @ Table X 10x, 3 sec  10x, 3 sec  Review                Conservative Seated HS Stretch X 20 sec 30 sec  2x30 sec       Quadruped Rock Back Stretch X 10x Left leg hooked around R ankle, 5x10 sec  5x10 sec                 Review nordic curl    ATS                                              NEUROMUSCULAR REEDUCATION           Hamstring Bridge   10x 10x      Hamstring Swiss Bal Rollout B/L   2x10  2x10 and 10x UL       Single Leg RDL     2x10 foot @ wall                                                                                                                     THERAPEUTIC  ACTIVITY                                                  GAIT TRAINING                                                  MODALITIES                                                 [1]   Past Medical History:  Diagnosis Date    Elevated BP without diagnosis of hypertension 1/7/2021    Groin lump 1/7/2021    HL (hearing loss) 2018    Recently more sever    Hypertension 2010 - 2012    resolved    Hypocalcemia 12/17/2020    Pneumonia     In past    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    Squamous cell skin cancer     left forearm

## (undated) DEVICE — TUBING SUCTION 5MM X 12 FT

## (undated) DEVICE — SUT PROLENE 4-0 PC-3 18 IN 8634G

## (undated) DEVICE — GLOVE INDICATOR PI UNDERGLOVE SZ 8 BLUE

## (undated) DEVICE — 3M™ STERI-STRIP™ REINFORCED ADHESIVE SKIN CLOSURES, R1547, 1/2 IN X 4 IN (12 MM X 100 MM), 6 STRIPS/ENVELOPE: Brand: 3M™ STERI-STRIP™

## (undated) DEVICE — GLOVE SRG BIOGEL 7.5

## (undated) DEVICE — CUFF TOURNIQUET 18 X 4 IN QUICK CONNECT DISP 1 BLADDER